# Patient Record
Sex: MALE | Race: WHITE | Employment: UNEMPLOYED | URBAN - NONMETROPOLITAN AREA
[De-identification: names, ages, dates, MRNs, and addresses within clinical notes are randomized per-mention and may not be internally consistent; named-entity substitution may affect disease eponyms.]

---

## 2024-09-21 ENCOUNTER — APPOINTMENT (OUTPATIENT)
Dept: CT IMAGING | Age: 54
End: 2024-09-21
Payer: MEDICAID

## 2024-09-21 ENCOUNTER — HOSPITAL ENCOUNTER (EMERGENCY)
Age: 54
Discharge: HOME | End: 2024-09-21
Attending: EMERGENCY MEDICINE
Payer: MEDICAID

## 2024-09-21 VITALS
HEART RATE: 94 BPM | HEIGHT: 70 IN | RESPIRATION RATE: 15 BRPM | SYSTOLIC BLOOD PRESSURE: 147 MMHG | OXYGEN SATURATION: 94 % | WEIGHT: 150 LBS | TEMPERATURE: 98.2 F | BODY MASS INDEX: 21.47 KG/M2 | DIASTOLIC BLOOD PRESSURE: 82 MMHG

## 2024-09-21 DIAGNOSIS — R18.8 OTHER ASCITES: Primary | ICD-10-CM

## 2024-09-21 DIAGNOSIS — C22.8 PRIMARY MALIGNANT NEOPLASM OF LIVER (HCC): ICD-10-CM

## 2024-09-21 LAB
ALBUMIN SERPL-MCNC: 2.8 G/DL (ref 3.5–5.2)
ALP SERPL-CCNC: 158 U/L (ref 40–129)
ALT SERPL-CCNC: 37 U/L (ref 5–41)
ANION GAP SERPL CALCULATED.3IONS-SCNC: 11 MMOL/L (ref 7–19)
APTT PPP: 31.4 SEC (ref 26–36.2)
AST SERPL-CCNC: 52 U/L (ref 5–40)
BASOPHILS # BLD: 0 K/UL (ref 0–0.2)
BASOPHILS NFR BLD: 0.7 % (ref 0–1)
BILIRUB SERPL-MCNC: 2.2 MG/DL (ref 0.2–1.2)
BUN SERPL-MCNC: 5 MG/DL (ref 6–20)
CALCIUM SERPL-MCNC: 8 MG/DL (ref 8.6–10)
CHLORIDE SERPL-SCNC: 107 MMOL/L (ref 98–111)
CO2 SERPL-SCNC: 22 MMOL/L (ref 22–29)
CREAT SERPL-MCNC: 0.5 MG/DL (ref 0.7–1.2)
EOSINOPHIL # BLD: 0.1 K/UL (ref 0–0.6)
EOSINOPHIL NFR BLD: 2.4 % (ref 0–5)
ERYTHROCYTE [DISTWIDTH] IN BLOOD BY AUTOMATED COUNT: 17.7 % (ref 11.5–14.5)
ETHANOLAMINE SERPL-MCNC: <10 MG/DL (ref 0–0.08)
GLUCOSE SERPL-MCNC: 101 MG/DL (ref 70–99)
HCT VFR BLD AUTO: 33.6 % (ref 42–52)
HGB BLD-MCNC: 11.3 G/DL (ref 14–18)
IMM GRANULOCYTES # BLD: 0 K/UL
INR PPP: 1.57 (ref 0.88–1.18)
LIPASE SERPL-CCNC: 29 U/L (ref 13–60)
LYMPHOCYTES # BLD: 1.2 K/UL (ref 1.1–4.5)
LYMPHOCYTES NFR BLD: 27.5 % (ref 20–40)
MCH RBC QN AUTO: 35.9 PG (ref 27–31)
MCHC RBC AUTO-ENTMCNC: 33.6 G/DL (ref 33–37)
MCV RBC AUTO: 106.7 FL (ref 80–94)
MONOCYTES # BLD: 0.6 K/UL (ref 0–0.9)
MONOCYTES NFR BLD: 14.6 % (ref 0–10)
NEUTROPHILS # BLD: 2.3 K/UL (ref 1.5–7.5)
NEUTS SEG NFR BLD: 54.6 % (ref 50–65)
PLATELET # BLD AUTO: 66 K/UL (ref 130–400)
PMV BLD AUTO: 10.7 FL (ref 9.4–12.4)
POTASSIUM SERPL-SCNC: 3.2 MMOL/L (ref 3.5–5)
PROT SERPL-MCNC: 5.9 G/DL (ref 6.4–8.3)
PROTHROMBIN TIME: 18.4 SEC (ref 12–14.6)
RBC # BLD AUTO: 3.15 M/UL (ref 4.7–6.1)
SODIUM SERPL-SCNC: 140 MMOL/L (ref 136–145)
WBC # BLD AUTO: 4.2 K/UL (ref 4.8–10.8)

## 2024-09-21 PROCEDURE — 85025 COMPLETE CBC W/AUTO DIFF WBC: CPT

## 2024-09-21 PROCEDURE — 85730 THROMBOPLASTIN TIME PARTIAL: CPT

## 2024-09-21 PROCEDURE — 99285 EMERGENCY DEPT VISIT HI MDM: CPT

## 2024-09-21 PROCEDURE — 80053 COMPREHEN METABOLIC PANEL: CPT

## 2024-09-21 PROCEDURE — 85610 PROTHROMBIN TIME: CPT

## 2024-09-21 PROCEDURE — 96375 TX/PRO/DX INJ NEW DRUG ADDON: CPT

## 2024-09-21 PROCEDURE — 6360000004 HC RX CONTRAST MEDICATION: Performed by: EMERGENCY MEDICINE

## 2024-09-21 PROCEDURE — 83690 ASSAY OF LIPASE: CPT

## 2024-09-21 PROCEDURE — 6360000002 HC RX W HCPCS: Performed by: EMERGENCY MEDICINE

## 2024-09-21 PROCEDURE — 36415 COLL VENOUS BLD VENIPUNCTURE: CPT

## 2024-09-21 PROCEDURE — 96374 THER/PROPH/DIAG INJ IV PUSH: CPT

## 2024-09-21 PROCEDURE — 82077 ASSAY SPEC XCP UR&BREATH IA: CPT

## 2024-09-21 PROCEDURE — 74177 CT ABD & PELVIS W/CONTRAST: CPT

## 2024-09-21 RX ORDER — IOPAMIDOL 755 MG/ML
70 INJECTION, SOLUTION INTRAVASCULAR
Status: COMPLETED | OUTPATIENT
Start: 2024-09-21 | End: 2024-09-21

## 2024-09-21 RX ORDER — LORAZEPAM 1 MG/1
1 TABLET ORAL EVERY 6 HOURS PRN
Qty: 8 TABLET | Refills: 0 | Status: ON HOLD | OUTPATIENT
Start: 2024-09-21 | End: 2024-10-21

## 2024-09-21 RX ORDER — MORPHINE SULFATE 4 MG/ML
4 INJECTION, SOLUTION INTRAMUSCULAR; INTRAVENOUS ONCE
Status: COMPLETED | OUTPATIENT
Start: 2024-09-21 | End: 2024-09-21

## 2024-09-21 RX ORDER — ONDANSETRON 2 MG/ML
4 INJECTION INTRAMUSCULAR; INTRAVENOUS ONCE
Status: COMPLETED | OUTPATIENT
Start: 2024-09-21 | End: 2024-09-21

## 2024-09-21 RX ADMIN — MORPHINE SULFATE 4 MG: 4 INJECTION, SOLUTION INTRAMUSCULAR; INTRAVENOUS at 09:27

## 2024-09-21 RX ADMIN — ONDANSETRON 4 MG: 2 INJECTION INTRAMUSCULAR; INTRAVENOUS at 09:27

## 2024-09-21 RX ADMIN — IOPAMIDOL 70 ML: 755 INJECTION, SOLUTION INTRAVENOUS at 09:16

## 2024-09-21 ASSESSMENT — PAIN SCALES - GENERAL: PAINLEVEL_OUTOF10: 9

## 2024-09-21 NOTE — ED PROVIDER NOTES
[facility] EMERGENCY DEPT  eMERGENCY dEPARTMENT eNCOUnter      Pt Name: Philip Young  MRN: [id number]  Birthdate 1970  Date of evaluation: 9/21/2024  Provider: Wayne Coe MD    CHIEF COMPLAINT       Chief Complaint   Patient presents with    Abdominal Pain     And swelling          HISTORY OF PRESENT ILLNESS   (Location/Symptom, Timing/Onset,Context/Setting, Quality, Duration, Modifying Factors, Severity)  Note limiting factors.   Philip Young is a 54 y.o. male who presents to the emergency department ***     HPI    NursingNotes were reviewed.    REVIEW OF SYSTEMS    (2-9 systems for level 4, 10 or more for level 5)     Review of Systems         PAST MEDICALHISTORY     Past Medical History:   Diagnosis Date    Liver cancer (HCC)          SURGICAL HISTORY     History reviewed. No pertinent surgical history.      CURRENT MEDICATIONS     Previous Medications    No medications on file       ALLERGIES     Patient has no known allergies.    FAMILY HISTORY     History reviewed. No pertinent family history.       SOCIAL HISTORY       Social History     Socioeconomic History    Marital status:      Spouse name: None    Number of children: None    Years of education: None    Highest education level: None   Tobacco Use    Smoking status: Never    Smokeless tobacco: Never   Vaping Use    Vaping status: Never Used   Substance and Sexual Activity    Alcohol use: Not Currently     Comment: been in [facility] for 13 years    Drug use: Never    Sexual activity: Defer     Social Determinants of Health      Received from [facility]    Family and Community Support    Received from [facility]    Abuse Screen    Received from [facility]    Housing Stability       SCREENINGS    Luis [place] Coma Scale  Eye Opening: Spontaneous  Best Verbal Response: Oriented  Best Motor Response: Obeys commands  [place] Coma Scale Score: 15        PHYSICAL EXAM    (up to 7 for level 4, 8 or more for level  5)     ED Triage Vitals [09/21/24 0753]   BP Systolic BP Percentile Diastolic BP Percentile Temp Temp src Pulse Respirations SpO2   (!) 150/83 -- -- 98.2 °F (36.8 °C) -- (!) 104 18 96 %      Height Weight - Scale         1.778 m (5' 10\") 68 kg (150 lb)             Physical Exam    DIAGNOSTIC RESULTS     EKG: All EKG's areinterpreted by the Emergency Department Physician who either signs or Co-signs this chart in the absence of a cardiologist.    ***    RADIOLOGY:  Non-plain film images such as CT, Ultrasound and MRI are read by the radiologist. Plain radiographic images are visualized and preliminarily interpreted bythe emergency physician with the below findings:    ***      CT ABDOMEN PELVIS W IV CONTRAST Additional Contrast? None   Final Result   1.  Cirrhosis with sequela of portal hypertension including moderate volume ascites.   2.  Indeterminate low-density and high density/enhancing hepatic lesions.  Correlate with any prior imaging, consider follow-up MRI as warranted.   ---------------------------        All CT scans are performed using dose optimization techniques as appropriate to the performed exam and include    at least one of the following: Automated exposure control, adjustment of the mA and/or kV according to size, and the use of iterative reconstruction technique.        ______________________________________    Electronically signed by: MCKENNA VILLALBA M.D.   Date:     09/21/2024   Time:    10:19               LABS:  Labs Reviewed   CBC WITH AUTO DIFFERENTIAL - Abnormal; Notable for the following components:       Result Value    WBC 4.2 (*)     RBC 3.15 (*)     Hemoglobin 11.3 (*)     Hematocrit 33.6 (*)     .7 (*)     MCH 35.9 (*)     RDW 17.7 (*)     Platelets 66 (*)     Monocytes % 14.6 (*)     All other components within normal limits   COMPREHENSIVE METABOLIC PANEL - Abnormal; Notable for the following components:    Potassium 3.2 (*)     Glucose 101 (*)     BUN 5 (*)     Creatinine 0.5

## 2024-09-28 ENCOUNTER — APPOINTMENT (OUTPATIENT)
Dept: GENERAL RADIOLOGY | Age: 54
End: 2024-09-28
Payer: MEDICAID

## 2024-09-28 ENCOUNTER — HOSPITAL ENCOUNTER (INPATIENT)
Age: 54
LOS: 4 days | Discharge: HOSPICE | DRG: 951 | End: 2024-10-02
Attending: FAMILY MEDICINE | Admitting: FAMILY MEDICINE
Payer: MEDICAID

## 2024-09-28 ENCOUNTER — HOSPITAL ENCOUNTER (EMERGENCY)
Age: 54
Discharge: HOSPICE | End: 2024-09-28
Attending: EMERGENCY MEDICINE
Payer: MEDICAID

## 2024-09-28 VITALS
SYSTOLIC BLOOD PRESSURE: 136 MMHG | HEART RATE: 98 BPM | DIASTOLIC BLOOD PRESSURE: 64 MMHG | WEIGHT: 150 LBS | RESPIRATION RATE: 20 BRPM | OXYGEN SATURATION: 92 % | TEMPERATURE: 97.6 F | BODY MASS INDEX: 21.52 KG/M2

## 2024-09-28 DIAGNOSIS — K74.60 HEPATIC CIRRHOSIS DUE TO CHRONIC HEPATITIS C INFECTION (HCC): Primary | ICD-10-CM

## 2024-09-28 DIAGNOSIS — B18.2 HEPATIC CIRRHOSIS DUE TO CHRONIC HEPATITIS C INFECTION (HCC): Primary | ICD-10-CM

## 2024-09-28 DIAGNOSIS — Z51.5 ADMISSION FOR HOSPICE CARE: ICD-10-CM

## 2024-09-28 PROBLEM — C22.0 CARCINOMA OF LIVER (HCC): Status: ACTIVE | Noted: 2024-09-28

## 2024-09-28 LAB
ALBUMIN SERPL-MCNC: 2.7 G/DL (ref 3.5–5.2)
ALP SERPL-CCNC: 173 U/L (ref 40–129)
ALT SERPL-CCNC: 36 U/L (ref 5–41)
ANION GAP SERPL CALCULATED.3IONS-SCNC: 12 MMOL/L (ref 7–19)
AST SERPL-CCNC: 60 U/L (ref 5–40)
BASOPHILS # BLD: 0 K/UL (ref 0–0.2)
BASOPHILS NFR BLD: 0.6 % (ref 0–1)
BILIRUB SERPL-MCNC: 2.7 MG/DL (ref 0.2–1.2)
BUN SERPL-MCNC: 4 MG/DL (ref 6–20)
CALCIUM SERPL-MCNC: 8.2 MG/DL (ref 8.6–10)
CHLORIDE SERPL-SCNC: 105 MMOL/L (ref 98–111)
CO2 SERPL-SCNC: 21 MMOL/L (ref 22–29)
CREAT SERPL-MCNC: 0.5 MG/DL (ref 0.7–1.2)
EKG P AXIS: 61 DEGREES
EKG P-R INTERVAL: 152 MS
EKG Q-T INTERVAL: 382 MS
EKG QRS DURATION: 94 MS
EKG QTC CALCULATION (BAZETT): 455 MS
EKG T AXIS: 9 DEGREES
EOSINOPHIL # BLD: 0.1 K/UL (ref 0–0.6)
EOSINOPHIL NFR BLD: 2.3 % (ref 0–5)
ERYTHROCYTE [DISTWIDTH] IN BLOOD BY AUTOMATED COUNT: 17.7 % (ref 11.5–14.5)
GLUCOSE SERPL-MCNC: 149 MG/DL (ref 70–99)
HCT VFR BLD AUTO: 37.3 % (ref 42–52)
HGB BLD-MCNC: 12.3 G/DL (ref 14–18)
IMM GRANULOCYTES # BLD: 0 K/UL
LYMPHOCYTES # BLD: 1.4 K/UL (ref 1.1–4.5)
LYMPHOCYTES NFR BLD: 28.7 % (ref 20–40)
MCH RBC QN AUTO: 36 PG (ref 27–31)
MCHC RBC AUTO-ENTMCNC: 33 G/DL (ref 33–37)
MCV RBC AUTO: 109.1 FL (ref 80–94)
MONOCYTES # BLD: 0.5 K/UL (ref 0–0.9)
MONOCYTES NFR BLD: 10.5 % (ref 0–10)
NEUTROPHILS # BLD: 2.8 K/UL (ref 1.5–7.5)
NEUTS SEG NFR BLD: 57.7 % (ref 50–65)
PLATELET # BLD AUTO: 93 K/UL (ref 130–400)
PMV BLD AUTO: 10.9 FL (ref 9.4–12.4)
POTASSIUM SERPL-SCNC: 3.5 MMOL/L (ref 3.5–5)
PROT SERPL-MCNC: 6.3 G/DL (ref 6.4–8.3)
RBC # BLD AUTO: 3.42 M/UL (ref 4.7–6.1)
SODIUM SERPL-SCNC: 138 MMOL/L (ref 136–145)
WBC # BLD AUTO: 4.8 K/UL (ref 4.8–10.8)

## 2024-09-28 PROCEDURE — 93010 ELECTROCARDIOGRAM REPORT: CPT | Performed by: INTERNAL MEDICINE

## 2024-09-28 PROCEDURE — 85025 COMPLETE CBC W/AUTO DIFF WBC: CPT

## 2024-09-28 PROCEDURE — 96374 THER/PROPH/DIAG INJ IV PUSH: CPT

## 2024-09-28 PROCEDURE — 71045 X-RAY EXAM CHEST 1 VIEW: CPT

## 2024-09-28 PROCEDURE — 36415 COLL VENOUS BLD VENIPUNCTURE: CPT

## 2024-09-28 PROCEDURE — 93005 ELECTROCARDIOGRAM TRACING: CPT | Performed by: EMERGENCY MEDICINE

## 2024-09-28 PROCEDURE — 6360000002 HC RX W HCPCS: Performed by: EMERGENCY MEDICINE

## 2024-09-28 PROCEDURE — 6560000002 HC HOSPICE GENERAL INPATIENT

## 2024-09-28 PROCEDURE — 80053 COMPREHEN METABOLIC PANEL: CPT

## 2024-09-28 PROCEDURE — 99285 EMERGENCY DEPT VISIT HI MDM: CPT

## 2024-09-28 PROCEDURE — 6370000000 HC RX 637 (ALT 250 FOR IP): Performed by: FAMILY MEDICINE

## 2024-09-28 RX ORDER — FENTANYL 12.5 UG/1
1 PATCH TRANSDERMAL
Status: DISCONTINUED | OUTPATIENT
Start: 2024-09-28 | End: 2024-09-30

## 2024-09-28 RX ORDER — DOCUSATE SODIUM 100 MG/1
100 CAPSULE, LIQUID FILLED ORAL DAILY
Status: DISCONTINUED | OUTPATIENT
Start: 2024-09-29 | End: 2024-10-02 | Stop reason: HOSPADM

## 2024-09-28 RX ORDER — BISACODYL 10 MG
10 SUPPOSITORY, RECTAL RECTAL DAILY PRN
Status: DISCONTINUED | OUTPATIENT
Start: 2024-09-28 | End: 2024-10-02 | Stop reason: HOSPADM

## 2024-09-28 RX ORDER — HYDROMORPHONE HYDROCHLORIDE 1 MG/ML
1 INJECTION, SOLUTION INTRAMUSCULAR; INTRAVENOUS; SUBCUTANEOUS
Status: DISCONTINUED | OUTPATIENT
Start: 2024-09-28 | End: 2024-10-02

## 2024-09-28 RX ORDER — HALOPERIDOL 2 MG/ML
5 SOLUTION ORAL EVERY 6 HOURS PRN
Status: DISCONTINUED | OUTPATIENT
Start: 2024-09-28 | End: 2024-10-01

## 2024-09-28 RX ORDER — FUROSEMIDE 20 MG
20 TABLET ORAL 2 TIMES DAILY
Status: DISCONTINUED | OUTPATIENT
Start: 2024-09-28 | End: 2024-10-02 | Stop reason: HOSPADM

## 2024-09-28 RX ORDER — MORPHINE SULFATE 2 MG/ML
2 INJECTION, SOLUTION INTRAMUSCULAR; INTRAVENOUS ONCE
Status: COMPLETED | OUTPATIENT
Start: 2024-09-28 | End: 2024-09-28

## 2024-09-28 RX ORDER — SODIUM CHLORIDE 0.9 % (FLUSH) 0.9 %
5-40 SYRINGE (ML) INJECTION PRN
Status: DISCONTINUED | OUTPATIENT
Start: 2024-09-28 | End: 2024-10-02

## 2024-09-28 RX ORDER — ONDANSETRON 4 MG/1
4 TABLET, FILM COATED ORAL EVERY 6 HOURS PRN
Status: DISCONTINUED | OUTPATIENT
Start: 2024-09-28 | End: 2024-10-02 | Stop reason: HOSPADM

## 2024-09-28 RX ORDER — SODIUM CHLORIDE 0.9 % (FLUSH) 0.9 %
5-40 SYRINGE (ML) INJECTION EVERY 12 HOURS SCHEDULED
Status: DISCONTINUED | OUTPATIENT
Start: 2024-09-28 | End: 2024-10-02 | Stop reason: ALTCHOICE

## 2024-09-28 RX ORDER — LORAZEPAM 2 MG/ML
1 INJECTION INTRAMUSCULAR EVERY 6 HOURS PRN
Status: DISCONTINUED | OUTPATIENT
Start: 2024-09-28 | End: 2024-10-02

## 2024-09-28 RX ORDER — LORAZEPAM 0.5 MG/1
1 TABLET ORAL EVERY 6 HOURS PRN
Status: DISCONTINUED | OUTPATIENT
Start: 2024-09-28 | End: 2024-10-01

## 2024-09-28 RX ADMIN — MORPHINE SULFATE 2 MG: 2 INJECTION, SOLUTION INTRAMUSCULAR; INTRAVENOUS at 17:14

## 2024-09-28 RX ADMIN — FUROSEMIDE 20 MG: 20 TABLET ORAL at 21:31

## 2024-09-28 RX ADMIN — LORAZEPAM 1 MG: 0.5 TABLET ORAL at 21:31

## 2024-09-28 ASSESSMENT — ENCOUNTER SYMPTOMS
NAUSEA: 0
SORE THROAT: 0
SHORTNESS OF BREATH: 1
ABDOMINAL PAIN: 1
RHINORRHEA: 0
SINUS PRESSURE: 0
DIARRHEA: 0
VOMITING: 0

## 2024-09-28 ASSESSMENT — PAIN DESCRIPTION - LOCATION: LOCATION: ABDOMEN

## 2024-09-28 ASSESSMENT — PAIN DESCRIPTION - DESCRIPTORS: DESCRIPTORS: CRAMPING;ACHING;DISCOMFORT

## 2024-09-28 ASSESSMENT — PAIN DESCRIPTION - PAIN TYPE: TYPE: CHRONIC PAIN

## 2024-09-28 ASSESSMENT — PAIN SCALES - GENERAL
PAINLEVEL_OUTOF10: 9
PAINLEVEL_OUTOF10: 9

## 2024-09-28 NOTE — ED PROVIDER NOTES
[facility] EMERGENCY DEPT  eMERGENCY dEPARTMENT eNCOUnter      Pt Name: Philip Young  MRN: [id number]  Birthdate 1970  Date of evaluation: 9/28/2024  Provider: Octaviano Lopez MD    CHIEF COMPLAINT       Chief Complaint   Patient presents with    Abdominal Pain     Abd swelling from liver cancer    Shortness of Breath         HISTORY OF PRESENT ILLNESS   (Location/Symptom, Timing/Onset,Context/Setting, Quality, Duration, Modifying Factors, Severity)  Note limiting factors.   Philip Young is a 54 y.o. male who presents to the emergency department ascites and shortness of breath.     HPI patient is a 54-year-old white male with a history of hep C cirrhosis who is here last week for the same problem he is here for this week which is that he desires therapeutic paracentesis and to be enrolled in hospice.  Last week when he was told he would have to wait for paracentesis he pulled out his IV and left the hospital and in the interim has not sought out care as per him and his family and today returns seeking the same things.  He was discharged on compassionate release 2 months ago from the [facility] senior care system.  Reports that over several years he had multiple procedures done to manage his cirrhosis including surgery and paracentesis and medications.  He has no access to any of that now over the last 2 months since his discharge from [facility] and moving back to this area where he is from.  No fever.  No nausea or vomiting.  His goal today is to be enrolled in hospice.  He reports that he has been taking Tylenol by the handful because he has no access to his typical pain medication.  He says before when he was in the senior care system he received oxycodone and lorazepam.  Denies being an active drinker or drug abuser.      NursingNotes were reviewed.    REVIEW OF SYSTEMS    (2-9 systems for level 4, 10 or more for level 5)     Review of Systems   Constitutional:  Negative for chills, diaphoresis, fatigue and fever.   HENT:

## 2024-09-28 NOTE — ED NOTES
Hospice consult called [phone]. Spoke with Rin who stated she was driving and would have another nurse call me shortly.

## 2024-09-29 PROCEDURE — 6560000002 HC HOSPICE GENERAL INPATIENT

## 2024-09-29 PROCEDURE — 2580000003 HC RX 258: Performed by: FAMILY MEDICINE

## 2024-09-29 PROCEDURE — 6370000000 HC RX 637 (ALT 250 FOR IP): Performed by: FAMILY MEDICINE

## 2024-09-29 RX ORDER — HYDROMORPHONE HYDROCHLORIDE 2 MG/1
1 TABLET ORAL EVERY 4 HOURS PRN
Status: DISCONTINUED | OUTPATIENT
Start: 2024-09-29 | End: 2024-10-01

## 2024-09-29 RX ORDER — LACTULOSE 10 G/15ML
20 SOLUTION ORAL 3 TIMES DAILY
Status: DISCONTINUED | OUTPATIENT
Start: 2024-09-29 | End: 2024-10-02 | Stop reason: HOSPADM

## 2024-09-29 RX ADMIN — LACTULOSE 20 G: 20 SOLUTION ORAL at 08:53

## 2024-09-29 RX ADMIN — SODIUM CHLORIDE, PRESERVATIVE FREE 10 ML: 5 INJECTION INTRAVENOUS at 20:09

## 2024-09-29 RX ADMIN — LORAZEPAM 1 MG: 0.5 TABLET ORAL at 09:01

## 2024-09-29 RX ADMIN — FUROSEMIDE 20 MG: 20 TABLET ORAL at 08:53

## 2024-09-29 RX ADMIN — HYDROMORPHONE HYDROCHLORIDE 1 MG: 2 TABLET ORAL at 09:01

## 2024-09-29 RX ADMIN — HYDROMORPHONE HYDROCHLORIDE 1 MG: 2 TABLET ORAL at 13:39

## 2024-09-29 RX ADMIN — DOCUSATE SODIUM 100 MG: 100 CAPSULE, LIQUID FILLED ORAL at 08:53

## 2024-09-29 RX ADMIN — LACTULOSE 20 G: 20 SOLUTION ORAL at 20:09

## 2024-09-29 RX ADMIN — HYDROMORPHONE HYDROCHLORIDE 1 MG: 2 TABLET ORAL at 03:14

## 2024-09-29 RX ADMIN — HYDROMORPHONE HYDROCHLORIDE 1 MG: 2 TABLET ORAL at 17:28

## 2024-09-29 RX ADMIN — HYDROMORPHONE HYDROCHLORIDE 1 MG: 2 TABLET ORAL at 22:19

## 2024-09-29 RX ADMIN — LACTULOSE 20 G: 20 SOLUTION ORAL at 13:39

## 2024-09-29 RX ADMIN — SODIUM CHLORIDE, PRESERVATIVE FREE 10 ML: 5 INJECTION INTRAVENOUS at 08:55

## 2024-09-29 RX ADMIN — FUROSEMIDE 20 MG: 20 TABLET ORAL at 17:29

## 2024-09-29 NOTE — H&P
Fentanyl patch 12 mcg/h placed for pain control will titrate as needed  Dilaudid 1 mg every 4 hours as needed severe pain, will try p.o. but if patient becomes nauseous may need to do IV  Lasix 20 mg 1 tablet twice a day  Lactulose 20 g 3 times daily  Ativan 1 mg p.o. every 6 hours as needed  Zofran 4 mg every 6 hours as needed nausea and vomiting  Haldol 5 mg every 6 hours as needed severe agitation  Colace 100 milligrams daily  Ultrasound-guided paracentesis hopefully tomorrow with albumin if needed  CMP, CBC, ammonia level in a.m.  Social work will definitely need to be involved with discharge planning and follow-up care      He is GIP appropriate for pain control and therapeutic paracentesis.    Consultations Ordered:  None    Electronically signed by Lisa Garcia MD on 9/29/24 at 7:45 AM CDT

## 2024-09-30 ENCOUNTER — APPOINTMENT (OUTPATIENT)
Dept: ULTRASOUND IMAGING | Age: 54
DRG: 951 | End: 2024-09-30
Attending: FAMILY MEDICINE
Payer: MEDICAID

## 2024-09-30 LAB
ALBUMIN SERPL-MCNC: 2.5 G/DL (ref 3.5–5.2)
ALP SERPL-CCNC: 139 U/L (ref 40–129)
ALT SERPL-CCNC: 30 U/L (ref 5–41)
AMMONIA PLAS-SCNC: 65 UMOL/L (ref 16–60)
ANION GAP SERPL CALCULATED.3IONS-SCNC: 9 MMOL/L (ref 7–19)
AST SERPL-CCNC: 40 U/L (ref 5–40)
BASOPHILS # BLD: 0 K/UL (ref 0–0.2)
BASOPHILS NFR BLD: 0.5 % (ref 0–1)
BILIRUB SERPL-MCNC: 2.9 MG/DL (ref 0.2–1.2)
BUN SERPL-MCNC: 4 MG/DL (ref 6–20)
CALCIUM SERPL-MCNC: 7.4 MG/DL (ref 8.6–10)
CHLORIDE SERPL-SCNC: 103 MMOL/L (ref 98–111)
CO2 SERPL-SCNC: 25 MMOL/L (ref 22–29)
CREAT SERPL-MCNC: 0.4 MG/DL (ref 0.7–1.2)
EOSINOPHIL # BLD: 0.1 K/UL (ref 0–0.6)
EOSINOPHIL NFR BLD: 1.3 % (ref 0–5)
ERYTHROCYTE [DISTWIDTH] IN BLOOD BY AUTOMATED COUNT: 17 % (ref 11.5–14.5)
GLUCOSE SERPL-MCNC: 105 MG/DL (ref 70–99)
HCT VFR BLD AUTO: 35.7 % (ref 42–52)
HGB BLD-MCNC: 11.9 G/DL (ref 14–18)
IMM GRANULOCYTES # BLD: 0 K/UL
LYMPHOCYTES # BLD: 1.1 K/UL (ref 1.1–4.5)
LYMPHOCYTES NFR BLD: 17.7 % (ref 20–40)
MAGNESIUM SERPL-MCNC: 1.7 MG/DL (ref 1.6–2.6)
MCH RBC QN AUTO: 36.6 PG (ref 27–31)
MCHC RBC AUTO-ENTMCNC: 33.3 G/DL (ref 33–37)
MCV RBC AUTO: 109.8 FL (ref 80–94)
MONOCYTES # BLD: 0.7 K/UL (ref 0–0.9)
MONOCYTES NFR BLD: 11.3 % (ref 0–10)
NEUTROPHILS # BLD: 4.3 K/UL (ref 1.5–7.5)
NEUTS SEG NFR BLD: 68.9 % (ref 50–65)
PLATELET # BLD AUTO: 90 K/UL (ref 130–400)
PMV BLD AUTO: 10.5 FL (ref 9.4–12.4)
POTASSIUM SERPL-SCNC: 3.4 MMOL/L (ref 3.5–5)
PROT SERPL-MCNC: 5.8 G/DL (ref 6.4–8.3)
RBC # BLD AUTO: 3.25 M/UL (ref 4.7–6.1)
SODIUM SERPL-SCNC: 137 MMOL/L (ref 136–145)
WBC # BLD AUTO: 6.3 K/UL (ref 4.8–10.8)

## 2024-09-30 PROCEDURE — 0W9G3ZZ DRAINAGE OF PERITONEAL CAVITY, PERCUTANEOUS APPROACH: ICD-10-PCS | Performed by: RADIOLOGY

## 2024-09-30 PROCEDURE — 2580000003 HC RX 258: Performed by: FAMILY MEDICINE

## 2024-09-30 PROCEDURE — 82140 ASSAY OF AMMONIA: CPT

## 2024-09-30 PROCEDURE — 6370000000 HC RX 637 (ALT 250 FOR IP): Performed by: INTERNAL MEDICINE

## 2024-09-30 PROCEDURE — 6370000000 HC RX 637 (ALT 250 FOR IP): Performed by: FAMILY MEDICINE

## 2024-09-30 PROCEDURE — 36415 COLL VENOUS BLD VENIPUNCTURE: CPT

## 2024-09-30 PROCEDURE — 85025 COMPLETE CBC W/AUTO DIFF WBC: CPT

## 2024-09-30 PROCEDURE — 6560000002 HC HOSPICE GENERAL INPATIENT

## 2024-09-30 PROCEDURE — 83735 ASSAY OF MAGNESIUM: CPT

## 2024-09-30 PROCEDURE — 80053 COMPREHEN METABOLIC PANEL: CPT

## 2024-09-30 PROCEDURE — C1729 CATH, DRAINAGE: HCPCS

## 2024-09-30 RX ORDER — FENTANYL 25 UG/1
1 PATCH TRANSDERMAL
Status: DISCONTINUED | OUTPATIENT
Start: 2024-09-30 | End: 2024-10-02 | Stop reason: HOSPADM

## 2024-09-30 RX ADMIN — LORAZEPAM 1 MG: 0.5 TABLET ORAL at 02:06

## 2024-09-30 RX ADMIN — FUROSEMIDE 20 MG: 20 TABLET ORAL at 08:26

## 2024-09-30 RX ADMIN — SODIUM CHLORIDE, PRESERVATIVE FREE 10 ML: 5 INJECTION INTRAVENOUS at 08:27

## 2024-09-30 RX ADMIN — LACTULOSE 20 G: 20 SOLUTION ORAL at 20:50

## 2024-09-30 RX ADMIN — DOCUSATE SODIUM 100 MG: 100 CAPSULE, LIQUID FILLED ORAL at 08:26

## 2024-09-30 RX ADMIN — ONDANSETRON HYDROCHLORIDE 4 MG: 4 TABLET, FILM COATED ORAL at 02:01

## 2024-09-30 RX ADMIN — LACTULOSE 20 G: 20 SOLUTION ORAL at 08:26

## 2024-09-30 RX ADMIN — LORAZEPAM 1 MG: 0.5 TABLET ORAL at 21:07

## 2024-09-30 RX ADMIN — HYDROMORPHONE HYDROCHLORIDE 1 MG: 2 TABLET ORAL at 21:06

## 2024-09-30 RX ADMIN — HYDROMORPHONE HYDROCHLORIDE 1 MG: 2 TABLET ORAL at 03:14

## 2024-09-30 RX ADMIN — HYDROMORPHONE HYDROCHLORIDE 1 MG: 2 TABLET ORAL at 12:50

## 2024-09-30 RX ADMIN — SODIUM CHLORIDE, PRESERVATIVE FREE 10 ML: 5 INJECTION INTRAVENOUS at 20:50

## 2024-09-30 RX ADMIN — FUROSEMIDE 20 MG: 20 TABLET ORAL at 18:24

## 2024-09-30 RX ADMIN — LORAZEPAM 1 MG: 0.5 TABLET ORAL at 12:50

## 2024-09-30 ASSESSMENT — ENCOUNTER SYMPTOMS
VOMITING: 0
ABDOMINAL PAIN: 1
DIARRHEA: 0
SHORTNESS OF BREATH: 0
NAUSEA: 0
ABDOMINAL DISTENTION: 1
ABDOMINAL PAIN: 0
ABDOMINAL DISTENTION: 1

## 2024-09-30 ASSESSMENT — PAIN DESCRIPTION - LOCATION: LOCATION: SCROTUM;ABDOMEN

## 2024-09-30 ASSESSMENT — PAIN SCALES - GENERAL: PAINLEVEL_OUTOF10: 8

## 2024-10-01 PROCEDURE — 6560000002 HC HOSPICE GENERAL INPATIENT

## 2024-10-01 PROCEDURE — 6370000000 HC RX 637 (ALT 250 FOR IP): Performed by: FAMILY MEDICINE

## 2024-10-01 PROCEDURE — 2580000003 HC RX 258: Performed by: FAMILY MEDICINE

## 2024-10-01 PROCEDURE — 6370000000 HC RX 637 (ALT 250 FOR IP): Performed by: INTERNAL MEDICINE

## 2024-10-01 RX ORDER — SPIRONOLACTONE 25 MG/1
25 TABLET ORAL DAILY
Status: DISCONTINUED | OUTPATIENT
Start: 2024-10-01 | End: 2024-10-02 | Stop reason: HOSPADM

## 2024-10-01 RX ORDER — HYDROMORPHONE HYDROCHLORIDE 2 MG/1
2 TABLET ORAL
Status: DISCONTINUED | OUTPATIENT
Start: 2024-10-01 | End: 2024-10-02 | Stop reason: HOSPADM

## 2024-10-01 RX ORDER — DOXYCYCLINE 100 MG/1
100 CAPSULE ORAL EVERY 12 HOURS SCHEDULED
Status: DISCONTINUED | OUTPATIENT
Start: 2024-10-01 | End: 2024-10-02 | Stop reason: HOSPADM

## 2024-10-01 RX ORDER — LORAZEPAM 0.5 MG/1
1 TABLET ORAL
Status: DISCONTINUED | OUTPATIENT
Start: 2024-10-01 | End: 2024-10-02 | Stop reason: HOSPADM

## 2024-10-01 RX ADMIN — DOCUSATE SODIUM 100 MG: 100 CAPSULE, LIQUID FILLED ORAL at 08:38

## 2024-10-01 RX ADMIN — DOXYCYCLINE HYCLATE 100 MG: 100 CAPSULE ORAL at 21:23

## 2024-10-01 RX ADMIN — DOXYCYCLINE HYCLATE 100 MG: 100 CAPSULE ORAL at 08:39

## 2024-10-01 RX ADMIN — SPIRONOLACTONE 25 MG: 25 TABLET ORAL at 08:38

## 2024-10-01 RX ADMIN — HYDROMORPHONE HYDROCHLORIDE 2 MG: 2 TABLET ORAL at 12:01

## 2024-10-01 RX ADMIN — HYDROMORPHONE HYDROCHLORIDE 2 MG: 2 TABLET ORAL at 21:23

## 2024-10-01 RX ADMIN — FUROSEMIDE 20 MG: 20 TABLET ORAL at 08:38

## 2024-10-01 RX ADMIN — SODIUM CHLORIDE, PRESERVATIVE FREE 10 ML: 5 INJECTION INTRAVENOUS at 21:23

## 2024-10-01 RX ADMIN — HYDROMORPHONE HYDROCHLORIDE 2 MG: 2 TABLET ORAL at 08:39

## 2024-10-01 RX ADMIN — LACTULOSE 20 G: 20 SOLUTION ORAL at 08:38

## 2024-10-01 RX ADMIN — FUROSEMIDE 20 MG: 20 TABLET ORAL at 17:16

## 2024-10-01 RX ADMIN — HYDROMORPHONE HYDROCHLORIDE 1 MG: 2 TABLET ORAL at 00:35

## 2024-10-01 RX ADMIN — SODIUM CHLORIDE, PRESERVATIVE FREE 10 ML: 5 INJECTION INTRAVENOUS at 08:40

## 2024-10-01 RX ADMIN — Medication 3 MG: at 00:35

## 2024-10-01 RX ADMIN — LACTULOSE 20 G: 20 SOLUTION ORAL at 21:23

## 2024-10-01 RX ADMIN — HYDROMORPHONE HYDROCHLORIDE 2 MG: 2 TABLET ORAL at 17:16

## 2024-10-01 RX ADMIN — LACTULOSE 20 G: 20 SOLUTION ORAL at 17:15

## 2024-10-01 ASSESSMENT — PAIN SCALES - GENERAL
PAINLEVEL_OUTOF10: 0
PAINLEVEL_OUTOF10: 9
PAINLEVEL_OUTOF10: 0
PAINLEVEL_OUTOF10: 9
PAINLEVEL_OUTOF10: 0
PAINLEVEL_OUTOF10: 9
PAINLEVEL_OUTOF10: 0

## 2024-10-01 ASSESSMENT — PAIN DESCRIPTION - LOCATION: LOCATION: SCROTUM

## 2024-10-02 VITALS
RESPIRATION RATE: 18 BRPM | TEMPERATURE: 99.9 F | OXYGEN SATURATION: 88 % | SYSTOLIC BLOOD PRESSURE: 130 MMHG | DIASTOLIC BLOOD PRESSURE: 64 MMHG | HEART RATE: 82 BPM

## 2024-10-02 PROCEDURE — 6370000000 HC RX 637 (ALT 250 FOR IP): Performed by: INTERNAL MEDICINE

## 2024-10-02 PROCEDURE — 6370000000 HC RX 637 (ALT 250 FOR IP): Performed by: FAMILY MEDICINE

## 2024-10-02 RX ADMIN — SPIRONOLACTONE 25 MG: 25 TABLET ORAL at 08:19

## 2024-10-02 RX ADMIN — HYDROMORPHONE HYDROCHLORIDE 2 MG: 2 TABLET ORAL at 06:36

## 2024-10-02 RX ADMIN — DOCUSATE SODIUM 100 MG: 100 CAPSULE, LIQUID FILLED ORAL at 08:19

## 2024-10-02 RX ADMIN — FUROSEMIDE 20 MG: 20 TABLET ORAL at 08:19

## 2024-10-02 RX ADMIN — LACTULOSE 20 G: 20 SOLUTION ORAL at 08:19

## 2024-10-02 RX ADMIN — DOXYCYCLINE HYCLATE 100 MG: 100 CAPSULE ORAL at 08:19

## 2024-10-02 RX ADMIN — HYDROMORPHONE HYDROCHLORIDE 2 MG: 2 TABLET ORAL at 09:42

## 2024-10-02 ASSESSMENT — PAIN SCALES - GENERAL: PAINLEVEL_OUTOF10: 9

## 2024-10-02 ASSESSMENT — PAIN DESCRIPTION - LOCATION
LOCATION: ABDOMEN;SCROTUM
LOCATION: ABDOMEN

## 2024-10-02 NOTE — DISCHARGE SUMMARY
Discharge Summary    Date: 10/2/2024  Patient Name: Philip Young    YOB: 1970     Age: 54 y.o.    Admit Date: 9/28/2024  Discharge Date: 10/2/2024  Discharge Condition: Poor    Admission Diagnosis  Liver cell carcinoma [C22.0];Carcinoma of liver (HCC) [C22.0]      Discharge Diagnosis  Principal Problem:    Carcinoma of liver (HCC)  Resolved Problems:    * No resolved hospital problems. *      Hospital Stay  Narrative of Hospital Course:  Patient was admitted to the hospice care center for symptom management of his liver failure and terminal ascites.  He also has a lot of issues with agitation and anxiety.  He has been treated with pain medication and anxiety medication with good results.  He is anxious to go home to his brother's house for continued hospice outpatient care.  Brother is adequate equipment delivered today and he will be discharged to his brother's house and looks well [place] this afternoon and will go on to hospice home care team.    Consultants:  None    Surgeries/procedures Performed:  He had a therapeutic paracentesis performed with 5 L of fluid pulled for comfort and palliative reasons.     Treatments:    Procedures    Paracentesis    Discharge Plan/Disposition:  Home    Hospital/Incidental Findings Requiring Follow Up:    Patient Instructions:    Diet: Regular Diet    Activity:Activity as Tolerated  For number of days (if applicable):      Other Instructions: As well as anxiety medication and has had some scrotal excoriation and cellulitis and he is being treated with doxycycline 100 twice daily and will take 9 more days of that.    Provider Follow-Up:   No follow-ups on file.     Significant Diagnostic Studies:    Recent Labs:  Admission on 09/28/2024  Sodium                                        Date: 09/30/2024  Value: 137         Ref range: 136 - 145 mmol/L   Status: Final  Potassium reflex Magnesium                    Date: 09/30/2024  Value: 3.4 (L)     Ref range: 3.5

## 2024-10-02 NOTE — PROGRESS NOTES
Appetite good for breakfast fed self, waiting to go home.  
Change of shift report and rounds. Patient up in chair talking to family. Alert and oriented. Requested and received warm  blankets.Safety check done.   
Dilaudid 1 mg given po and ativan 1 mg given po and ice pack placed in pillow case and place underneath his scrotum and elevated. Another ice pack placed in pillow case and placed on top of his scrotum. Will continue to monitor. Patient aware to use call light if needing assist to get up. Bed in low position/locked and SR up x2, call light within reach.  
Dilaudid 2 mg given by mouth complains of pain all over. Patient going home today.  
Dr Corral making rounds. Answered patient questions. No new orders at this time.   
Dr. Corral here new orders received and noted.   
Dr. Corral visit with patient this date.  Answered patient's questions and discussed discharge today.  Patient care and condition of past 24 hours discussed.  Patient to be discharged today.  
Dr. Dobbs here making rounds, patient remains GIP level of care.  
F/U: patient resting in bed with eyes closed. Denies any pain at present time. Call light in reach. Bed in low, locked position with side rails up x 2.  
FOLLOW UP FOR PRN DILAUDID 2MG GIVEN.  Patient  resting on couch with eyes closed, respirations  nonlabored.  FLACC 0.  Will continue to monitor.   
Follow up PRN  hydromorphone- patient resting quietly in bed.   
Follow up PRN hydromorphone - patient resting quietly  
Follow up PRN lorazepam and PRN ondansetron- patient is resting quietly in bed with eyes closed.   
Follow up PRN lorazepam- patient resting quietly in bed with eyes closed  
LPN to RN bedside rounds and report completed. Patient resting quietly with eyes closed. No family present.  
PAIN: Patient rates pain to abdomen at 9/10.  He took Dilaudid 2mg oral tablet for pain.  He was assisted to stand at side of bed to urinate 200mls of straw colored clear urine.  He became nauseated when he was standing up and was short of air also.  He refused further medication for nausea and refused oxygen source.  RN cleansed bathroom floor from a period of incontinence during the day.  Patient was able to return back to bed and asked for some ice water.    At FOLLOW-UP at 21:50pm:  Patient was resting on left side with pillow support.  He states he would like to keep the television on for the night.  Ice water was given to patient and he was appreciative.  Patient wanted bedside lamp turned out and door to room pulled for decrease stimulation to promote rest.  He states pain was \"better\", FLACC 2/10.  Right anticubital IV site was leaking during flushing, cathlon was removed from right AC without difficulty.  Cotton and bandaid dressing applied to site.  
PRN DILAUDID 2 MG PO GIVEN. Patient rates pain as 9/10 to groin area.  Will continue to monitor.   
PRN Lorazepam given for anxiety and PRN ondansetron given nausea  
PRN follow up after giving dilaudid 1 mg and haldol 3 mg. Ice Pack to scrotum  Effective, resting quietly with eyes closed.  
PRN follow up after giving dilaudid 1 mg po and ativan 1 mg po  Effective, resting quietly with eyes closed.  
PRN hydromorphone 1 mg PO given for complaints of abd pain   
PRN hydromorphone 1 mg PO given for pain in scrotum and abd.   
PRN lorazepam given for restlessness. Fentanyl patch 12 mg placed between inner left shoulder blade.   
Patient arrived from ED via stretcher. Transferred to bed. Patient is alert and oriented. Asking about pain medications explained that I would get him some as soon as I had orders and he was admitted. Patient verbalized understanding. Spoke with Dr Garcia about admission and obtained orders.   
Patient c/o pain. Visibly agitated. PRN dilaudid and ativan given.  
Patient called out requesting pain medication for abd pain. PRN Hydromorphone 1 mg PO given  
Patient requested pain medication, anxious.  Ativan 1 mg by mouth, Dilaudid 0.5 mg by mouth given. Call light in reach. Bed in low, locked position with side rails up x 2.  Appetite poor at breakfast, feeds self.  
Patient requested pain medication. Dilaudid 1 mg given by mouth. Complains of abdomen pain Patient voided in urinal. Call light in reach. Bed in low, locked position with side rails up x 3.  
Patient resting in bed, no further complains of pain, very drowsy with pain medication. HOB up. Abdomen remains enlarged from ascites. Call light in reach. Patient aware to call for help up to the bathroom. No stool yet for OB.  
Patient resting with eyes closed during rounds with offgoing staff. Respirations even and unlabored. Call light within reach.  
Patient resting with eyes closed, audible snores heard. Appears that PRN meds were effective.  
Patient returned from paracentesis procedure via stretcher. Patient assisted to move from stretcher to bed.   
Patient sitting up in bed eating lunch. Appetite poor, feeds self. Instructed patient that staff had order for OB for stools, voiced understanding.  
Patient transported to ultrasound by transportation tech.  
Patient's brother bedside at this time. Paracentesis procedure discussed with both patient and brother. All questions answered, patient to remain npo until after procedure.  
Pt awake and c/o pain to scrotum. Rates pain at 8/10, states \"It's a little better.\" Medicated with dilaudid 2 mg PO.   
Pt c/o pain to scrotum. Rates pain 9/10. Declines offer of ice pack. Attempted to use athletic support and pt states it is too painful. Medicated with Dilaudid 2 mg by mouth.   
Pt is resting in bed with eyes closed. Respirations even and unlabored. FLACC score=0.  
Pt is resting in bed with eyes closed. Respirations even and unlabored. No acute distress. FLACC score=0.  
Pt requesting something for pain to scrotum rated at 9/10. Medicated with Dilaudid 2 mg PO. Pt tolerating well.  
Pt resting quietly in bed with eyes closed. Respirations even and unlabored. No acute distress.  
Report given to BRYAN Black, questions answered. Explained to patient and brother that nurse would do a tuck in visit, voiced understanding.  
SN heard patient getting out of bed, found patient trying to get out of bed. States I need to use the urinal. SN assist patient to stand and he voided 100 ML dewey urine. Patient moaning in pain and grimacing, and restless, rates pain a 9 in abdomen and scrotum.     0035 AM  Dilaudid 1 mg given po, and only took haldol 3 mg as he said it locks him up and would not take the last 2mg. Ice pack applied underneath scrotum. Will continue to monitor.  
SN present at the patient's bedside along with the night nurses for unit rounds. Patient is sleeping comfortably with no signs or symptoms of pain or restlessness noted. The bed is in the lowest position with the wheels locked. The call light is within reach. No family is present at the bedside. Will continue to monitor.  
Scheduled lactulose given po and IID to right forearm flushed. Patient states my scrotum is hurting so bad and it has been swollen for 5 days. Patient did agree to have SN assess his scrotum and noted area to been red and swollen along with penis is red and swollen. Patient transferred to chair so SN could fix sheets on bed. Patient is weak and needs assist with transfers. SN assist patient back to bed. SR up x 2, call light within reach.  
The pt is lying in bed.  He rates his pain at 4 but states the nurse is aware and getting him some medication.  He expresses no needs.  He states he plans to dc to his brothers home today. He expresses a basis john and seems disinterested. Emotional and sc support provided. Will follow during [facility] stay.   
The pt is sleeping soundly. He did not respond when his name was called. No needs identified. No family present. Will follow.   
Vital signs taken, patient warm to touch T100.6 P108 R16 B/P150/60. Oxygen saturation 88% on room air, and oxygen in place at 2L/NC.With assist X2 patient stood to void and had 200 ML dewey urine in urinal. Assist back to bed, conts ot have pain in scrotum. Will inform Dr. Corral this morning when he makes rounds.  
  .1* 109.8*   RDW 17.7* 17.0*   PLT 93* 90*     CHEMISTRIES:  Recent Labs     09/28/24  1521 09/30/24  0554    137   K 3.5 3.4*    103   CO2 21* 25   BUN 4* 4*   CREATININE 0.5* 0.4*   GLUCOSE 149* 105*   MG  --  1.7     PT/INR:No results for input(s): \"PROTIME\", \"INR\" in the last 72 hours.  APTT:No results for input(s): \"APTT\" in the last 72 hours.  LIVER PROFILE:  Recent Labs     09/28/24  1521 09/30/24  0554   AST 60* 40   ALT 36 30   BILITOT 2.7* 2.9*   ALKPHOS 173* 139*       Imaging Last 24 Hours:  US GUIDED PARACENTESIS    Result Date: 9/30/2024  EXAM:  ULTRASOUND-GUIDED ABDOMINAL PARACENTESIS FOR THERAPEUTIC PURPOSES  HISTORY:  Ascites  COMPARISON:  None.  FINDINGS:  Written and verbal informed consent was obtained.  Patient is of infection and bleeding were discussed.  The left lower abdominal wall was prepped and draped in a sterile manner.  5 ml of 1% lidocaine was injected for local anesthesia  a small skin incision was made with a scalpel.    5-[place] YUEH catheter was advanced into the abdominal peritoneal fluid collection using real-time ultrasound guidance.  5 liters of reddish fluid was removed from the abdomen.  The catheter was removed.  There are no immediate complaints or complications  The patient will be transferred to hospice      abdominal paracentesis was performed and 5 liters was removed from the abdomen. for therapeutic purposes   ______________________________________ Electronically signed by: ЮЛИЯ OLVERA M.D. Date:     09/30/2024 Time:    16:57     Assessment//Plan           Hospital Problems             Last Modified POA    * (Principal) Carcinoma of liver (HCC) 9/28/2024 Yes     Assessment:    Condition: In serious condition.  Worsening.   (Patient had a paracentesis yesterday with 5 L of fluid removed.  He is not complaining of his much abdominal pain today as he is continued scrotal pain and edema.  Scrotum looks more excoriated today.  He has been getting 
3.4*    103   CO2 21* 25   BUN 4* 4*   CREATININE 0.5* 0.4*   GLUCOSE 149* 105*   MG  --  1.7     PT/INR:No results for input(s): \"PROTIME\", \"INR\" in the last 72 hours.  APTT:No results for input(s): \"APTT\" in the last 72 hours.  LIVER PROFILE:  Recent Labs     09/28/24  1521 09/30/24  0554   AST 60* 40   ALT 36 30   BILITOT 2.7* 2.9*   ALKPHOS 173* 139*       Imaging Last 24 Hours:  XR CHEST PORTABLE    Result Date: 9/28/2024  EXAM: CHEST RADIOGRAPH  TECHNIQUE: Single frontal chest radiograph.  HISTORY: Shortness of breath.  COMPARISON: None.  FINDINGS: The lungs show no consolidation, pleural effusion or pneumothorax.  Mild scarring seen in the right lower lobe.  Cardiomediastinal silhouette and pulmonary vessels are within normal limits.  Upper abdomen is unremarkable.  Eventration of the right hemidiaphragm.  No acute bony abnormality.      1.  No acute cardiopulmonary disease    ______________________________________ Electronically signed by: DENIA CRUZ M.D. Date:     09/28/2024 Time:    16:21     Assessment//Plan           Hospital Problems             Last Modified POA    * (Principal) Carcinoma of liver (HCC) 9/28/2024 Yes     Assessment:    Condition: In unstable condition.  Worsening.   (Patient complains of a lot of discomfort in his abdomen legs and scrotum.  His abdominal exam is quite tight and distended.  Therapeutic paracentesis has been planned for this morning.  He is getting pain medication regularly and will increase his fentanyl now to 25 mics.  He is been getting Dilaudid pushes whenever he can get MRI done about every 3 hours.).     Plan:    (Plan is for paracentesis today for therapeutic effect.  If that can be accomplished the brother plans to take him home where he has been living in his basement with outpatient hospice he is getting a fentanyl patch and has been getting oral medications for pain in anticipation of his discharge to his brother's house.  He is GIP appropriate and

## 2024-11-04 ENCOUNTER — HOSPITAL ENCOUNTER (INPATIENT)
Age: 54
LOS: 9 days | DRG: 951 | End: 2024-11-13
Attending: INTERNAL MEDICINE | Admitting: INTERNAL MEDICINE
Payer: MEDICAID

## 2024-11-04 PROBLEM — C22.9 MALIGNANT NEOPLASM OF LIVER, NOT SPECIFIED AS PRIMARY OR SECONDARY (HCC): Status: ACTIVE | Noted: 2024-11-04

## 2024-11-04 LAB — SARS-COV-2 RDRP RESP QL NAA+PROBE: NOT DETECTED

## 2024-11-04 PROCEDURE — 6370000000 HC RX 637 (ALT 250 FOR IP): Performed by: INTERNAL MEDICINE

## 2024-11-04 PROCEDURE — 6560000002 HC HOSPICE GENERAL INPATIENT

## 2024-11-04 PROCEDURE — 87635 SARS-COV-2 COVID-19 AMP PRB: CPT

## 2024-11-04 RX ORDER — PROCHLORPERAZINE MALEATE 10 MG
10 TABLET ORAL EVERY 6 HOURS PRN
Status: DISCONTINUED | OUTPATIENT
Start: 2024-11-04 | End: 2024-11-07

## 2024-11-04 RX ORDER — RISPERIDONE 1 MG/ML
1 SOLUTION ORAL EVERY 6 HOURS PRN
Status: DISCONTINUED | OUTPATIENT
Start: 2024-11-04 | End: 2024-11-13 | Stop reason: HOSPADM

## 2024-11-04 RX ORDER — LORAZEPAM 2 MG/ML
1 INJECTION INTRAMUSCULAR EVERY 4 HOURS PRN
Status: DISCONTINUED | OUTPATIENT
Start: 2024-11-04 | End: 2024-11-12

## 2024-11-04 RX ORDER — LACTULOSE 10 G/15ML
20 SOLUTION ORAL 2 TIMES DAILY
Status: DISCONTINUED | OUTPATIENT
Start: 2024-11-04 | End: 2024-11-06

## 2024-11-04 RX ORDER — BISACODYL 10 MG
10 SUPPOSITORY, RECTAL RECTAL DAILY PRN
Status: DISCONTINUED | OUTPATIENT
Start: 2024-11-04 | End: 2024-11-13 | Stop reason: HOSPADM

## 2024-11-04 RX ORDER — FUROSEMIDE 40 MG/1
40 TABLET ORAL DAILY
Status: DISCONTINUED | OUTPATIENT
Start: 2024-11-05 | End: 2024-11-07

## 2024-11-04 RX ORDER — ONDANSETRON 4 MG/1
4 TABLET, ORALLY DISINTEGRATING ORAL EVERY 6 HOURS PRN
Status: DISCONTINUED | OUTPATIENT
Start: 2024-11-04 | End: 2024-11-07

## 2024-11-04 RX ORDER — LORAZEPAM 0.5 MG/1
1 TABLET ORAL
Status: DISCONTINUED | OUTPATIENT
Start: 2024-11-04 | End: 2024-11-07

## 2024-11-04 RX ORDER — ONDANSETRON 2 MG/ML
4 INJECTION INTRAMUSCULAR; INTRAVENOUS EVERY 6 HOURS PRN
Status: DISCONTINUED | OUTPATIENT
Start: 2024-11-04 | End: 2024-11-13 | Stop reason: HOSPADM

## 2024-11-04 RX ORDER — FENTANYL 75 UG/1
1 PATCH TRANSDERMAL
Status: DISCONTINUED | OUTPATIENT
Start: 2024-11-05 | End: 2024-11-05

## 2024-11-04 RX ORDER — SPIRONOLACTONE 25 MG/1
25 TABLET ORAL 2 TIMES DAILY
Status: DISCONTINUED | OUTPATIENT
Start: 2024-11-04 | End: 2024-11-07

## 2024-11-04 RX ORDER — HYDROMORPHONE HYDROCHLORIDE 1 MG/ML
1 INJECTION, SOLUTION INTRAMUSCULAR; INTRAVENOUS; SUBCUTANEOUS
Status: DISCONTINUED | OUTPATIENT
Start: 2024-11-04 | End: 2024-11-08

## 2024-11-04 RX ORDER — ACETAMINOPHEN 650 MG/1
650 SUPPOSITORY RECTAL EVERY 6 HOURS PRN
Status: DISCONTINUED | OUTPATIENT
Start: 2024-11-04 | End: 2024-11-13 | Stop reason: HOSPADM

## 2024-11-04 RX ORDER — HYDROMORPHONE HYDROCHLORIDE 1 MG/ML
0.5 INJECTION, SOLUTION INTRAMUSCULAR; INTRAVENOUS; SUBCUTANEOUS
Status: DISCONTINUED | OUTPATIENT
Start: 2024-11-04 | End: 2024-11-08

## 2024-11-04 RX ORDER — HYDROMORPHONE HYDROCHLORIDE 2 MG/1
4 TABLET ORAL
Status: DISCONTINUED | OUTPATIENT
Start: 2024-11-04 | End: 2024-11-07

## 2024-11-04 RX ADMIN — LACTULOSE 20 G: 20 SOLUTION ORAL at 20:59

## 2024-11-04 RX ADMIN — HYDROMORPHONE HYDROCHLORIDE 4 MG: 2 TABLET ORAL at 21:32

## 2024-11-04 RX ADMIN — SPIRONOLACTONE 25 MG: 25 TABLET ORAL at 20:59

## 2024-11-04 RX ADMIN — HYDROMORPHONE HYDROCHLORIDE 4 MG: 2 TABLET ORAL at 16:32

## 2024-11-04 RX ADMIN — ONDANSETRON 4 MG: 4 TABLET, ORALLY DISINTEGRATING ORAL at 21:51

## 2024-11-04 RX ADMIN — LORAZEPAM 1 MG: 0.5 TABLET ORAL at 22:33

## 2024-11-04 RX ADMIN — LORAZEPAM 1 MG: 0.5 TABLET ORAL at 16:00

## 2024-11-04 ASSESSMENT — PAIN DESCRIPTION - LOCATION
LOCATION: BACK;LEG;ABDOMEN
LOCATION: ABDOMEN;LEG;BACK

## 2024-11-04 ASSESSMENT — PAIN SCALES - GENERAL
PAINLEVEL_OUTOF10: 8
PAINLEVEL_OUTOF10: 7
PAINLEVEL_OUTOF10: 8

## 2024-11-04 ASSESSMENT — PAIN DESCRIPTION - DESCRIPTORS
DESCRIPTORS: ACHING;CRAMPING;DISCOMFORT
DESCRIPTORS: ACHING;CRAMPING;STABBING

## 2024-11-04 ASSESSMENT — PAIN DESCRIPTION - ORIENTATION
ORIENTATION: LEFT;RIGHT;LOWER
ORIENTATION: RIGHT;LEFT;LOWER

## 2024-11-04 ASSESSMENT — PAIN DESCRIPTION - PAIN TYPE: TYPE: CHRONIC PAIN;NEUROPATHIC PAIN;DEEP SOMATIC PAIN

## 2024-11-05 PROBLEM — K74.60 ESOPHAGEAL VARICES IN CIRRHOSIS (HCC): Status: ACTIVE | Noted: 2024-11-05

## 2024-11-05 PROBLEM — R45.1 RESTLESSNESS AND AGITATION: Status: ACTIVE | Noted: 2024-11-05

## 2024-11-05 PROBLEM — E46 PROTEIN CALORIE MALNUTRITION (HCC): Status: ACTIVE | Noted: 2024-11-05

## 2024-11-05 PROBLEM — R18.8 ASCITES: Status: ACTIVE | Noted: 2024-11-05

## 2024-11-05 PROBLEM — K76.6 PORTAL HYPERTENSION (HCC): Status: ACTIVE | Noted: 2024-11-05

## 2024-11-05 PROBLEM — D69.6 THROMBOCYTOPENIA (HCC): Status: ACTIVE | Noted: 2024-11-05

## 2024-11-05 PROBLEM — K76.82 HEPATIC ENCEPHALOPATHY (HCC): Status: ACTIVE | Noted: 2024-11-05

## 2024-11-05 PROBLEM — I85.10 ESOPHAGEAL VARICES IN CIRRHOSIS (HCC): Status: ACTIVE | Noted: 2024-11-05

## 2024-11-05 PROBLEM — K74.60 CIRRHOSIS (HCC): Status: ACTIVE | Noted: 2024-11-05

## 2024-11-05 PROBLEM — D53.9 MACROCYTIC ANEMIA: Status: ACTIVE | Noted: 2024-11-05

## 2024-11-05 PROBLEM — B18.2 CHRONIC HEPATITIS C (HCC): Status: ACTIVE | Noted: 2024-11-05

## 2024-11-05 LAB
ALBUMIN SERPL-MCNC: 2.3 G/DL (ref 3.5–5.2)
ALP SERPL-CCNC: 119 U/L (ref 40–129)
ALT SERPL-CCNC: 23 U/L (ref 5–41)
AMMONIA PLAS-SCNC: 72 UMOL/L (ref 16–60)
ANION GAP SERPL CALCULATED.3IONS-SCNC: 9 MMOL/L (ref 7–19)
AST SERPL-CCNC: 47 U/L (ref 5–40)
BILIRUB SERPL-MCNC: 3.4 MG/DL (ref 0.2–1.2)
BUN SERPL-MCNC: 6 MG/DL (ref 6–20)
CALCIUM SERPL-MCNC: 8 MG/DL (ref 8.6–10)
CHLORIDE SERPL-SCNC: 101 MMOL/L (ref 98–111)
CO2 SERPL-SCNC: 27 MMOL/L (ref 22–29)
CREAT SERPL-MCNC: 0.6 MG/DL (ref 0.7–1.2)
GLUCOSE SERPL-MCNC: 154 MG/DL (ref 70–99)
POTASSIUM SERPL-SCNC: 3.3 MMOL/L (ref 3.5–5)
PROT SERPL-MCNC: 6.2 G/DL (ref 6.4–8.3)
SODIUM SERPL-SCNC: 137 MMOL/L (ref 136–145)

## 2024-11-05 PROCEDURE — 82140 ASSAY OF AMMONIA: CPT

## 2024-11-05 PROCEDURE — 6370000000 HC RX 637 (ALT 250 FOR IP): Performed by: INTERNAL MEDICINE

## 2024-11-05 PROCEDURE — 80053 COMPREHEN METABOLIC PANEL: CPT

## 2024-11-05 PROCEDURE — 36415 COLL VENOUS BLD VENIPUNCTURE: CPT

## 2024-11-05 PROCEDURE — 6560000002 HC HOSPICE GENERAL INPATIENT

## 2024-11-05 RX ORDER — ACETAMINOPHEN 325 MG/1
650 TABLET ORAL EVERY 4 HOURS PRN
Status: DISCONTINUED | OUTPATIENT
Start: 2024-11-05 | End: 2024-11-07

## 2024-11-05 RX ORDER — FENTANYL 100 UG/1
1 PATCH TRANSDERMAL
Status: DISCONTINUED | OUTPATIENT
Start: 2024-11-05 | End: 2024-11-13 | Stop reason: HOSPADM

## 2024-11-05 RX ADMIN — HYDROMORPHONE HYDROCHLORIDE 4 MG: 2 TABLET ORAL at 18:18

## 2024-11-05 RX ADMIN — SPIRONOLACTONE 25 MG: 25 TABLET ORAL at 18:18

## 2024-11-05 RX ADMIN — LACTULOSE 20 G: 20 SOLUTION ORAL at 23:15

## 2024-11-05 RX ADMIN — HYDROMORPHONE HYDROCHLORIDE 4 MG: 2 TABLET ORAL at 06:56

## 2024-11-05 RX ADMIN — Medication 1 MG: at 03:42

## 2024-11-05 RX ADMIN — ONDANSETRON 4 MG: 4 TABLET, ORALLY DISINTEGRATING ORAL at 11:16

## 2024-11-05 RX ADMIN — SPIRONOLACTONE 25 MG: 25 TABLET ORAL at 08:17

## 2024-11-05 RX ADMIN — HYDROMORPHONE HYDROCHLORIDE 4 MG: 2 TABLET ORAL at 09:51

## 2024-11-05 RX ADMIN — FUROSEMIDE 40 MG: 40 TABLET ORAL at 08:17

## 2024-11-05 RX ADMIN — ACETAMINOPHEN 650 MG: 325 TABLET ORAL at 03:42

## 2024-11-05 RX ADMIN — Medication 1 MG: at 09:52

## 2024-11-05 RX ADMIN — LACTULOSE 20 G: 20 SOLUTION ORAL at 08:18

## 2024-11-05 RX ADMIN — HYDROMORPHONE HYDROCHLORIDE 4 MG: 2 TABLET ORAL at 01:27

## 2024-11-05 RX ADMIN — HYDROMORPHONE HYDROCHLORIDE 4 MG: 2 TABLET ORAL at 23:14

## 2024-11-05 RX ADMIN — Medication 1 MG: at 23:15

## 2024-11-05 RX ADMIN — LORAZEPAM 1 MG: 0.5 TABLET ORAL at 18:18

## 2024-11-05 ASSESSMENT — PAIN - FUNCTIONAL ASSESSMENT: PAIN_FUNCTIONAL_ASSESSMENT: PREVENTS OR INTERFERES SOME ACTIVE ACTIVITIES AND ADLS

## 2024-11-05 ASSESSMENT — PAIN DESCRIPTION - DESCRIPTORS
DESCRIPTORS: ACHING;DISCOMFORT
DESCRIPTORS: ACHING
DESCRIPTORS: ACHING

## 2024-11-05 ASSESSMENT — PAIN SCALES - GENERAL
PAINLEVEL_OUTOF10: 9
PAINLEVEL_OUTOF10: 8

## 2024-11-05 ASSESSMENT — PAIN DESCRIPTION - LOCATION
LOCATION: ABDOMEN;GENERALIZED;BACK
LOCATION: ABDOMEN;BACK
LOCATION: BACK

## 2024-11-05 ASSESSMENT — PAIN DESCRIPTION - PAIN TYPE: TYPE: CHRONIC PAIN

## 2024-11-05 ASSESSMENT — PAIN DESCRIPTION - ONSET: ONSET: AWAKENED FROM SLEEP

## 2024-11-05 ASSESSMENT — PAIN DESCRIPTION - FREQUENCY: FREQUENCY: INTERMITTENT

## 2024-11-05 NOTE — H&P
specified as primary or secondary (HCC) 11/5/2024 Yes    Cirrhosis (HCC) 11/5/2024 Yes    Chronic hepatitis C (HCC) 11/5/2024 Yes    Portal hypertension (HCC) 11/5/2024 Yes    Esophageal varices in cirrhosis (HCC) 11/5/2024 Yes    Thrombocytopenia (HCC) 11/5/2024 Yes    Macrocytic anemia 11/5/2024 Yes    Ascites 11/5/2024 Yes    Protein calorie malnutrition (HCC) 11/5/2024 Yes    Hepatic encephalopathy (HCC) 11/5/2024 Yes       Plan   Check ammonia and CMP  Continue lactulose adjusting as needed  Continue spironolactone and furosemide for his ascites  Oral or IV Dilaudid as needed for pain  Continue fentanyl patch increasing to 100 mcg  Ativan for anxiety  Risperdal for agitation  Treat nausea and vomiting as needed  Consider issues with disposition and start working towards possible placement assuming he stabilizes  He is GIP appropriate     Electronically signed by Alex Chang MD on 11/5/24 at 8:50 AM CST

## 2024-11-06 PROCEDURE — 3E033GC INTRODUCTION OF OTHER THERAPEUTIC SUBSTANCE INTO PERIPHERAL VEIN, PERCUTANEOUS APPROACH: ICD-10-PCS | Performed by: INTERNAL MEDICINE

## 2024-11-06 PROCEDURE — 2580000003 HC RX 258: Performed by: INTERNAL MEDICINE

## 2024-11-06 PROCEDURE — 6370000000 HC RX 637 (ALT 250 FOR IP): Performed by: INTERNAL MEDICINE

## 2024-11-06 PROCEDURE — 6360000002 HC RX W HCPCS: Performed by: INTERNAL MEDICINE

## 2024-11-06 PROCEDURE — 6560000002 HC HOSPICE GENERAL INPATIENT

## 2024-11-06 RX ORDER — NEOMYCIN SULFATE 500 MG/1
500 TABLET ORAL EVERY 12 HOURS
Status: DISCONTINUED | OUTPATIENT
Start: 2024-11-06 | End: 2024-11-07

## 2024-11-06 RX ORDER — QUETIAPINE FUMARATE 50 MG/1
50 TABLET, FILM COATED ORAL NIGHTLY
Status: DISCONTINUED | OUTPATIENT
Start: 2024-11-06 | End: 2024-11-07

## 2024-11-06 RX ORDER — HYDROMORPHONE HYDROCHLORIDE 1 MG/ML
1 INJECTION, SOLUTION INTRAMUSCULAR; INTRAVENOUS; SUBCUTANEOUS
Status: DISCONTINUED | OUTPATIENT
Start: 2024-11-06 | End: 2024-11-13 | Stop reason: HOSPADM

## 2024-11-06 RX ORDER — SODIUM CHLORIDE 0.9 % (FLUSH) 0.9 %
5-40 SYRINGE (ML) INJECTION PRN
Status: DISCONTINUED | OUTPATIENT
Start: 2024-11-06 | End: 2024-11-13 | Stop reason: HOSPADM

## 2024-11-06 RX ORDER — POTASSIUM CHLORIDE 750 MG/1
20 TABLET, EXTENDED RELEASE ORAL DAILY
Status: DISCONTINUED | OUTPATIENT
Start: 2024-11-06 | End: 2024-11-07

## 2024-11-06 RX ORDER — SODIUM CHLORIDE 9 MG/ML
INJECTION, SOLUTION INTRAVENOUS CONTINUOUS
Status: DISCONTINUED | OUTPATIENT
Start: 2024-11-06 | End: 2024-11-08

## 2024-11-06 RX ORDER — LACTULOSE 10 G/15ML
20 SOLUTION ORAL 3 TIMES DAILY
Status: DISCONTINUED | OUTPATIENT
Start: 2024-11-06 | End: 2024-11-07

## 2024-11-06 RX ADMIN — LORAZEPAM 1 MG: 0.5 TABLET ORAL at 10:03

## 2024-11-06 RX ADMIN — Medication 1 MG: at 09:26

## 2024-11-06 RX ADMIN — LORAZEPAM 1 MG: 0.5 TABLET ORAL at 05:54

## 2024-11-06 RX ADMIN — SPIRONOLACTONE 25 MG: 25 TABLET ORAL at 07:49

## 2024-11-06 RX ADMIN — LACTULOSE 20 G: 20 SOLUTION ORAL at 19:40

## 2024-11-06 RX ADMIN — Medication 1 MG/HR: at 19:12

## 2024-11-06 RX ADMIN — HYDROMORPHONE HYDROCHLORIDE 1 MG: 1 INJECTION, SOLUTION INTRAMUSCULAR; INTRAVENOUS; SUBCUTANEOUS at 16:33

## 2024-11-06 RX ADMIN — HYDROMORPHONE HYDROCHLORIDE 4 MG: 2 TABLET ORAL at 05:51

## 2024-11-06 RX ADMIN — HYDROMORPHONE HYDROCHLORIDE 4 MG: 2 TABLET ORAL at 09:26

## 2024-11-06 RX ADMIN — HYDROMORPHONE HYDROCHLORIDE 1 MG: 1 INJECTION, SOLUTION INTRAMUSCULAR; INTRAVENOUS; SUBCUTANEOUS at 10:45

## 2024-11-06 RX ADMIN — LORAZEPAM 1 MG: 2 INJECTION INTRAMUSCULAR; INTRAVENOUS at 13:10

## 2024-11-06 RX ADMIN — FUROSEMIDE 40 MG: 40 TABLET ORAL at 07:49

## 2024-11-06 RX ADMIN — SPIRONOLACTONE 25 MG: 25 TABLET ORAL at 17:57

## 2024-11-06 RX ADMIN — NEOMYCIN SULFATE 500 MG: 500 TABLET ORAL at 19:40

## 2024-11-06 RX ADMIN — NEOMYCIN SULFATE 500 MG: 500 TABLET ORAL at 10:03

## 2024-11-06 RX ADMIN — LACTULOSE 20 G: 20 SOLUTION ORAL at 07:49

## 2024-11-06 RX ADMIN — QUETIAPINE FUMARATE 50 MG: 50 TABLET ORAL at 19:40

## 2024-11-06 RX ADMIN — HYDROMORPHONE HYDROCHLORIDE 1 MG: 1 INJECTION, SOLUTION INTRAMUSCULAR; INTRAVENOUS; SUBCUTANEOUS at 18:26

## 2024-11-06 RX ADMIN — LACTULOSE 20 G: 20 SOLUTION ORAL at 13:06

## 2024-11-06 RX ADMIN — SODIUM CHLORIDE: 9 INJECTION, SOLUTION INTRAVENOUS at 19:12

## 2024-11-06 RX ADMIN — POTASSIUM CHLORIDE 20 MEQ: 750 TABLET, EXTENDED RELEASE ORAL at 13:06

## 2024-11-06 RX ADMIN — LORAZEPAM 1 MG: 0.5 TABLET ORAL at 17:57

## 2024-11-06 ASSESSMENT — PAIN - FUNCTIONAL ASSESSMENT: PAIN_FUNCTIONAL_ASSESSMENT: PREVENTS OR INTERFERES SOME ACTIVE ACTIVITIES AND ADLS

## 2024-11-06 ASSESSMENT — PAIN DESCRIPTION - LOCATION: LOCATION: BACK;LEG

## 2024-11-06 ASSESSMENT — PAIN SCALES - GENERAL
PAINLEVEL_OUTOF10: 8
PAINLEVEL_OUTOF10: 8

## 2024-11-06 ASSESSMENT — PAIN DESCRIPTION - DESCRIPTORS: DESCRIPTORS: CRAMPING;SHOOTING;STABBING

## 2024-11-06 ASSESSMENT — PAIN DESCRIPTION - ORIENTATION: ORIENTATION: RIGHT;LEFT

## 2024-11-07 PROCEDURE — 6360000002 HC RX W HCPCS: Performed by: INTERNAL MEDICINE

## 2024-11-07 PROCEDURE — 2580000003 HC RX 258: Performed by: INTERNAL MEDICINE

## 2024-11-07 PROCEDURE — 6560000002 HC HOSPICE GENERAL INPATIENT

## 2024-11-07 RX ADMIN — LORAZEPAM 1 MG: 2 INJECTION INTRAMUSCULAR; INTRAVENOUS at 20:36

## 2024-11-07 RX ADMIN — Medication 1.75 MG/HR: at 16:00

## 2024-11-07 RX ADMIN — Medication 1.5 MG/HR: at 06:05

## 2024-11-07 RX ADMIN — SODIUM CHLORIDE: 9 INJECTION, SOLUTION INTRAVENOUS at 20:22

## 2024-11-07 RX ADMIN — Medication 1.75 MG/HR: at 11:04

## 2024-11-07 ASSESSMENT — PAIN SCALES - GENERAL
PAINLEVEL_OUTOF10: 2
PAINLEVEL_OUTOF10: 0
PAINLEVEL_OUTOF10: 5

## 2024-11-07 ASSESSMENT — PAIN DESCRIPTION - LOCATION
LOCATION: GENERALIZED;ABDOMEN;BACK
LOCATION: BACK;ABDOMEN
LOCATION: ABDOMEN;BACK;GENERALIZED

## 2024-11-07 ASSESSMENT — PAIN DESCRIPTION - DESCRIPTORS
DESCRIPTORS: PATIENT UNABLE TO DESCRIBE
DESCRIPTORS: PATIENT UNABLE TO DESCRIBE

## 2024-11-08 PROCEDURE — 6360000002 HC RX W HCPCS: Performed by: INTERNAL MEDICINE

## 2024-11-08 PROCEDURE — 2709999900 HC NON-CHARGEABLE SUPPLY

## 2024-11-08 PROCEDURE — 76937 US GUIDE VASCULAR ACCESS: CPT

## 2024-11-08 PROCEDURE — 36415 COLL VENOUS BLD VENIPUNCTURE: CPT

## 2024-11-08 PROCEDURE — 6560000002 HC HOSPICE GENERAL INPATIENT

## 2024-11-08 PROCEDURE — 6370000000 HC RX 637 (ALT 250 FOR IP): Performed by: INTERNAL MEDICINE

## 2024-11-08 RX ORDER — LACTULOSE 10 G/15ML
20 SOLUTION ORAL 3 TIMES DAILY
Status: DISCONTINUED | OUTPATIENT
Start: 2024-11-08 | End: 2024-11-13 | Stop reason: HOSPADM

## 2024-11-08 RX ORDER — RISPERIDONE 1 MG/ML
1 SOLUTION ORAL NIGHTLY
Status: DISCONTINUED | OUTPATIENT
Start: 2024-11-08 | End: 2024-11-13 | Stop reason: HOSPADM

## 2024-11-08 RX ORDER — NEOMYCIN SULFATE 500 MG/1
500 TABLET ORAL EVERY 12 HOURS
Status: DISCONTINUED | OUTPATIENT
Start: 2024-11-08 | End: 2024-11-13 | Stop reason: HOSPADM

## 2024-11-08 RX ADMIN — LACTULOSE 20 G: 20 SOLUTION ORAL at 13:22

## 2024-11-08 RX ADMIN — Medication 3.75 MG/HR: at 21:10

## 2024-11-08 RX ADMIN — Medication 2 MG/HR: at 00:28

## 2024-11-08 RX ADMIN — LACTULOSE 20 G: 20 SOLUTION ORAL at 10:46

## 2024-11-08 RX ADMIN — HYDROMORPHONE HYDROCHLORIDE 1 MG: 1 INJECTION, SOLUTION INTRAMUSCULAR; INTRAVENOUS; SUBCUTANEOUS at 18:11

## 2024-11-08 RX ADMIN — Medication 1 MG: at 02:24

## 2024-11-08 RX ADMIN — LORAZEPAM 1 MG: 2 INJECTION INTRAMUSCULAR; INTRAVENOUS at 02:01

## 2024-11-08 RX ADMIN — NEOMYCIN SULFATE 500 MG: 500 TABLET ORAL at 10:46

## 2024-11-08 RX ADMIN — LORAZEPAM 1 MG: 2 INJECTION INTRAMUSCULAR; INTRAVENOUS at 18:11

## 2024-11-08 RX ADMIN — Medication 2.75 MG/HR: at 09:31

## 2024-11-08 RX ADMIN — Medication 3.25 MG/HR: at 16:36

## 2024-11-08 ASSESSMENT — PAIN SCALES - GENERAL
PAINLEVEL_OUTOF10: 0
PAINLEVEL_OUTOF10: 8
PAINLEVEL_OUTOF10: 0
PAINLEVEL_OUTOF10: 8
PAINLEVEL_OUTOF10: 8

## 2024-11-08 ASSESSMENT — PAIN DESCRIPTION - LOCATION
LOCATION: BACK

## 2024-11-08 NOTE — CONSULTS
[facility] Vascular Access Team:  Consult Note    Patient: Philip Young  YOB: 1970   MRN: [id number]  Room: [place] [address]     Attending Physician: Alex Chang MD  Ordering Physician: Alex Chang MD    Diagnosis:   Malignant neoplasm of liver, not specified as primary or secondary [C22.9]  Malignant neoplasm of liver, not specified as primary or secondary (HCC) [C22.9]    Active LDAs:  Peripheral IV 11/06/24 Left;Proximal;Anterior Forearm (Active)   Number of days: 1       Peripheral IV 11/08/24 Distal;Right;Anterior Forearm (Active)   Number of days: 0       Reason for Consult:  [facility] vascular access team consulted for placement of Ultrasound Guided Peripheral IV.    Indication(s):  Philip Young is a 54 y.o. male admitted to [place] [address] for Restlessness and agitation. Philip Young currently has a 22 gauge PIV in his left forearm that this nurse placed on 11/6. Hospice nurse states the IV pump has frequent occlusion alarms without obvious cause.     Findings:  Assessed patient's LUE and found the IV site to be clean and dry, dressing is lifting on corner possibly contributing to the alarms. Went ahead and placed a new 22 gauge ultrasound guided peripheral IV in the patient's right forearm with one attempt and no complications. Patient tolerated very well.     Impression/Plan:  1. Ultrasound-Guided Peripheral IV is ready to be used    Thank you for your time and consult.     Electronically Signed By: Dagoberto Mata RN on 11/8/2024 at 11:16 AM

## 2024-11-09 PROCEDURE — 6360000002 HC RX W HCPCS: Performed by: INTERNAL MEDICINE

## 2024-11-09 PROCEDURE — 2580000003 HC RX 258: Performed by: INTERNAL MEDICINE

## 2024-11-09 PROCEDURE — 6560000002 HC HOSPICE GENERAL INPATIENT

## 2024-11-09 PROCEDURE — 6370000000 HC RX 637 (ALT 250 FOR IP): Performed by: FAMILY MEDICINE

## 2024-11-09 PROCEDURE — 6360000002 HC RX W HCPCS: Performed by: FAMILY MEDICINE

## 2024-11-09 RX ORDER — SCOLOPAMINE TRANSDERMAL SYSTEM 1 MG/1
1 PATCH, EXTENDED RELEASE TRANSDERMAL
Status: DISCONTINUED | OUTPATIENT
Start: 2024-11-09 | End: 2024-11-12

## 2024-11-09 RX ORDER — GLYCOPYRROLATE 0.2 MG/ML
0.2 INJECTION INTRAMUSCULAR; INTRAVENOUS EVERY 4 HOURS PRN
Status: DISCONTINUED | OUTPATIENT
Start: 2024-11-09 | End: 2024-11-13 | Stop reason: HOSPADM

## 2024-11-09 RX ADMIN — HYDROMORPHONE HYDROCHLORIDE 1 MG: 1 INJECTION, SOLUTION INTRAMUSCULAR; INTRAVENOUS; SUBCUTANEOUS at 07:44

## 2024-11-09 RX ADMIN — LORAZEPAM 1 MG: 2 INJECTION INTRAMUSCULAR; INTRAVENOUS at 18:05

## 2024-11-09 RX ADMIN — HYDROMORPHONE HYDROCHLORIDE 4.5 MG/HR: 10 INJECTION, SOLUTION INTRAMUSCULAR; INTRAVENOUS; SUBCUTANEOUS at 21:50

## 2024-11-09 RX ADMIN — GLYCOPYRROLATE 0.2 MG: 0.2 INJECTION INTRAMUSCULAR; INTRAVENOUS at 16:21

## 2024-11-09 RX ADMIN — GLYCOPYRROLATE 0.2 MG: 0.2 INJECTION INTRAMUSCULAR; INTRAVENOUS at 11:48

## 2024-11-09 RX ADMIN — Medication 4 MG/HR: at 07:42

## 2024-11-09 RX ADMIN — Medication 4 MG/HR: at 12:30

## 2024-11-09 RX ADMIN — LORAZEPAM 1 MG: 2 INJECTION INTRAMUSCULAR; INTRAVENOUS at 07:44

## 2024-11-09 RX ADMIN — HYDROMORPHONE HYDROCHLORIDE 1 MG: 1 INJECTION, SOLUTION INTRAMUSCULAR; INTRAVENOUS; SUBCUTANEOUS at 18:05

## 2024-11-09 RX ADMIN — Medication 4.5 MG/HR: at 16:58

## 2024-11-09 RX ADMIN — Medication 3.75 MG/HR: at 02:51

## 2024-11-09 ASSESSMENT — PAIN SCALES - GENERAL
PAINLEVEL_OUTOF10: 0
PAINLEVEL_OUTOF10: 0

## 2024-11-10 PROCEDURE — 6360000002 HC RX W HCPCS: Performed by: INTERNAL MEDICINE

## 2024-11-10 PROCEDURE — 6370000000 HC RX 637 (ALT 250 FOR IP): Performed by: INTERNAL MEDICINE

## 2024-11-10 PROCEDURE — 6560000002 HC HOSPICE GENERAL INPATIENT

## 2024-11-10 PROCEDURE — 2580000003 HC RX 258: Performed by: INTERNAL MEDICINE

## 2024-11-10 RX ADMIN — HYDROMORPHONE HYDROCHLORIDE 1 MG: 1 INJECTION, SOLUTION INTRAMUSCULAR; INTRAVENOUS; SUBCUTANEOUS at 04:57

## 2024-11-10 RX ADMIN — HYDROMORPHONE HYDROCHLORIDE 5 MG/HR: 10 INJECTION, SOLUTION INTRAMUSCULAR; INTRAVENOUS; SUBCUTANEOUS at 21:18

## 2024-11-10 RX ADMIN — LACTULOSE 20 G: 20 SOLUTION ORAL at 13:43

## 2024-11-10 RX ADMIN — ACETAMINOPHEN 650 MG: 650 SUPPOSITORY RECTAL at 13:34

## 2024-11-10 RX ADMIN — LORAZEPAM 1 MG: 2 INJECTION INTRAMUSCULAR; INTRAVENOUS at 04:57

## 2024-11-10 RX ADMIN — LACTULOSE 20 G: 20 SOLUTION ORAL at 09:05

## 2024-11-10 RX ADMIN — HYDROMORPHONE HYDROCHLORIDE 4.75 MG/HR: 10 INJECTION, SOLUTION INTRAMUSCULAR; INTRAVENOUS; SUBCUTANEOUS at 10:30

## 2024-11-10 ASSESSMENT — PAIN SCALES - GENERAL
PAINLEVEL_OUTOF10: 0
PAINLEVEL_OUTOF10: 8
PAINLEVEL_OUTOF10: 8
PAINLEVEL_OUTOF10: 0
PAINLEVEL_OUTOF10: 0

## 2024-11-11 PROCEDURE — 6360000002 HC RX W HCPCS: Performed by: INTERNAL MEDICINE

## 2024-11-11 PROCEDURE — 6370000000 HC RX 637 (ALT 250 FOR IP): Performed by: INTERNAL MEDICINE

## 2024-11-11 PROCEDURE — 2580000003 HC RX 258: Performed by: INTERNAL MEDICINE

## 2024-11-11 PROCEDURE — 6360000002 HC RX W HCPCS: Performed by: FAMILY MEDICINE

## 2024-11-11 PROCEDURE — 6560000002 HC HOSPICE GENERAL INPATIENT

## 2024-11-11 RX ADMIN — SODIUM CHLORIDE, PRESERVATIVE FREE 10 ML: 5 INJECTION INTRAVENOUS at 16:17

## 2024-11-11 RX ADMIN — HYDROMORPHONE HYDROCHLORIDE 5.5 MG/HR: 10 INJECTION, SOLUTION INTRAMUSCULAR; INTRAVENOUS; SUBCUTANEOUS at 08:11

## 2024-11-11 RX ADMIN — LORAZEPAM 1 MG: 2 INJECTION INTRAMUSCULAR; INTRAVENOUS at 16:17

## 2024-11-11 RX ADMIN — HYDROMORPHONE HYDROCHLORIDE 1 MG: 1 INJECTION, SOLUTION INTRAMUSCULAR; INTRAVENOUS; SUBCUTANEOUS at 16:17

## 2024-11-11 RX ADMIN — GLYCOPYRROLATE 0.2 MG: 0.2 INJECTION INTRAMUSCULAR; INTRAVENOUS at 16:17

## 2024-11-11 RX ADMIN — HYDROMORPHONE HYDROCHLORIDE 6.5 MG/HR: 10 INJECTION, SOLUTION INTRAMUSCULAR; INTRAVENOUS; SUBCUTANEOUS at 17:31

## 2024-11-11 RX ADMIN — HYDROMORPHONE HYDROCHLORIDE 1 MG: 1 INJECTION, SOLUTION INTRAMUSCULAR; INTRAVENOUS; SUBCUTANEOUS at 22:09

## 2024-11-11 RX ADMIN — LORAZEPAM 1 MG: 2 INJECTION INTRAMUSCULAR; INTRAVENOUS at 22:09

## 2024-11-11 RX ADMIN — LORAZEPAM 1 MG: 2 INJECTION INTRAMUSCULAR; INTRAVENOUS at 02:46

## 2024-11-11 ASSESSMENT — PAIN SCALES - GENERAL
PAINLEVEL_OUTOF10: 0
PAINLEVEL_OUTOF10: 5
PAINLEVEL_OUTOF10: 6
PAINLEVEL_OUTOF10: 5
PAINLEVEL_OUTOF10: 0
PAINLEVEL_OUTOF10: 5
PAINLEVEL_OUTOF10: 0
PAINLEVEL_OUTOF10: 0

## 2024-11-11 ASSESSMENT — PAIN DESCRIPTION - DESCRIPTORS
DESCRIPTORS: PATIENT UNABLE TO DESCRIBE

## 2024-11-11 ASSESSMENT — PAIN DESCRIPTION - LOCATION
LOCATION: ABDOMEN;BACK;GENERALIZED
LOCATION: BACK
LOCATION: BACK;GENERALIZED

## 2024-11-11 ASSESSMENT — ENCOUNTER SYMPTOMS
ABDOMINAL DISTENTION: 1
SHORTNESS OF BREATH: 0
VOMITING: 0
DIARRHEA: 0
TROUBLE SWALLOWING: 1
ABDOMINAL PAIN: 1

## 2024-11-12 PROCEDURE — 6560000002 HC HOSPICE GENERAL INPATIENT

## 2024-11-12 PROCEDURE — 6360000002 HC RX W HCPCS: Performed by: INTERNAL MEDICINE

## 2024-11-12 PROCEDURE — 2580000003 HC RX 258: Performed by: INTERNAL MEDICINE

## 2024-11-12 PROCEDURE — 2700000000 HC OXYGEN THERAPY PER DAY

## 2024-11-12 PROCEDURE — 6360000002 HC RX W HCPCS: Performed by: FAMILY MEDICINE

## 2024-11-12 PROCEDURE — 6370000000 HC RX 637 (ALT 250 FOR IP): Performed by: INTERNAL MEDICINE

## 2024-11-12 RX ORDER — SCOLOPAMINE TRANSDERMAL SYSTEM 1 MG/1
1 PATCH, EXTENDED RELEASE TRANSDERMAL
Status: DISCONTINUED | OUTPATIENT
Start: 2024-11-12 | End: 2024-11-12

## 2024-11-12 RX ORDER — SCOLOPAMINE TRANSDERMAL SYSTEM 1 MG/1
3 PATCH, EXTENDED RELEASE TRANSDERMAL
Status: DISCONTINUED | OUTPATIENT
Start: 2024-11-12 | End: 2024-11-12

## 2024-11-12 RX ORDER — MIDAZOLAM HYDROCHLORIDE 1 MG/ML
1 INJECTION, SOLUTION INTRAVENOUS CONTINUOUS
Status: DISCONTINUED | OUTPATIENT
Start: 2024-11-12 | End: 2024-11-13 | Stop reason: HOSPADM

## 2024-11-12 RX ORDER — LORAZEPAM 2 MG/ML
1 INJECTION INTRAMUSCULAR
Status: DISCONTINUED | OUTPATIENT
Start: 2024-11-12 | End: 2024-11-13 | Stop reason: HOSPADM

## 2024-11-12 RX ORDER — SCOLOPAMINE TRANSDERMAL SYSTEM 1 MG/1
3 PATCH, EXTENDED RELEASE TRANSDERMAL
Status: DISCONTINUED | OUTPATIENT
Start: 2024-11-12 | End: 2024-11-13 | Stop reason: HOSPADM

## 2024-11-12 RX ADMIN — HYDROMORPHONE HYDROCHLORIDE 8.5 MG/HR: 10 INJECTION, SOLUTION INTRAMUSCULAR; INTRAVENOUS; SUBCUTANEOUS at 16:25

## 2024-11-12 RX ADMIN — SODIUM CHLORIDE, PRESERVATIVE FREE 10 ML: 5 INJECTION INTRAVENOUS at 08:33

## 2024-11-12 RX ADMIN — LORAZEPAM 1 MG: 2 INJECTION INTRAMUSCULAR; INTRAVENOUS at 04:01

## 2024-11-12 RX ADMIN — SODIUM CHLORIDE, PRESERVATIVE FREE 10 ML: 5 INJECTION INTRAVENOUS at 14:50

## 2024-11-12 RX ADMIN — GLYCOPYRROLATE 0.2 MG: 0.2 INJECTION INTRAMUSCULAR; INTRAVENOUS at 20:16

## 2024-11-12 RX ADMIN — HYDROMORPHONE HYDROCHLORIDE 8.75 MG/HR: 10 INJECTION, SOLUTION INTRAMUSCULAR; INTRAVENOUS; SUBCUTANEOUS at 22:57

## 2024-11-12 RX ADMIN — HYDROMORPHONE HYDROCHLORIDE 6.75 MG/HR: 10 INJECTION, SOLUTION INTRAMUSCULAR; INTRAVENOUS; SUBCUTANEOUS at 02:56

## 2024-11-12 RX ADMIN — HYDROMORPHONE HYDROCHLORIDE 1 MG: 1 INJECTION, SOLUTION INTRAMUSCULAR; INTRAVENOUS; SUBCUTANEOUS at 20:15

## 2024-11-12 RX ADMIN — ONDANSETRON 4 MG: 2 INJECTION INTRAMUSCULAR; INTRAVENOUS at 20:15

## 2024-11-12 RX ADMIN — GLYCOPYRROLATE 0.2 MG: 0.2 INJECTION INTRAMUSCULAR; INTRAVENOUS at 14:50

## 2024-11-12 RX ADMIN — HYDROMORPHONE HYDROCHLORIDE 1 MG: 1 INJECTION, SOLUTION INTRAMUSCULAR; INTRAVENOUS; SUBCUTANEOUS at 04:01

## 2024-11-12 RX ADMIN — MIDAZOLAM IN SODIUM CHLORIDE 1 MG/HR: 1 INJECTION INTRAVENOUS at 09:40

## 2024-11-12 RX ADMIN — GLYCOPYRROLATE 0.2 MG: 0.2 INJECTION INTRAMUSCULAR; INTRAVENOUS at 08:33

## 2024-11-12 RX ADMIN — HYDROMORPHONE HYDROCHLORIDE 7.5 MG/HR: 10 INJECTION, SOLUTION INTRAMUSCULAR; INTRAVENOUS; SUBCUTANEOUS at 08:33

## 2024-11-12 ASSESSMENT — PAIN DESCRIPTION - DESCRIPTORS
DESCRIPTORS: PATIENT UNABLE TO DESCRIBE

## 2024-11-12 ASSESSMENT — PAIN SCALES - GENERAL
PAINLEVEL_OUTOF10: 5
PAINLEVEL_OUTOF10: 0
PAINLEVEL_OUTOF10: 2
PAINLEVEL_OUTOF10: 5
PAINLEVEL_OUTOF10: 0
PAINLEVEL_OUTOF10: 3
PAINLEVEL_OUTOF10: 5
PAINLEVEL_OUTOF10: 2

## 2024-11-12 ASSESSMENT — PAIN DESCRIPTION - LOCATION
LOCATION: ABDOMEN;BACK;GENERALIZED
LOCATION: BACK;ABDOMEN;GENERALIZED
LOCATION: ABDOMEN;BACK
LOCATION: ABDOMEN;BACK;GENERALIZED

## 2024-11-12 ASSESSMENT — ENCOUNTER SYMPTOMS
VOMITING: 0
DIARRHEA: 0
ABDOMINAL DISTENTION: 1

## 2024-11-12 NOTE — PRE-PROCEDURE INSTRUCTIONS
Pt provided with incontinence care of urine as Pena continues to leak.  Pena flushed and repositioned in leg securement device.  Pt turned and repositioned to his back with pillows used to support body, extremities and to off load heels.  Pt had restlessness during care.  When care completed and Pt at rest he was resting comfortably with FLACC of 0, no restlessness, and had no symptoms of distress.  Safety measures in place.

## 2024-11-13 VITALS
RESPIRATION RATE: 28 BRPM | TEMPERATURE: 98.6 F | SYSTOLIC BLOOD PRESSURE: 99 MMHG | DIASTOLIC BLOOD PRESSURE: 48 MMHG | HEART RATE: 127 BPM | OXYGEN SATURATION: 41 %

## 2024-11-13 PROCEDURE — 6360000002 HC RX W HCPCS: Performed by: INTERNAL MEDICINE

## 2024-11-13 PROCEDURE — 6360000002 HC RX W HCPCS: Performed by: FAMILY MEDICINE

## 2024-11-13 PROCEDURE — 2580000003 HC RX 258: Performed by: INTERNAL MEDICINE

## 2024-11-13 RX ADMIN — GLYCOPYRROLATE 0.2 MG: 0.2 INJECTION INTRAMUSCULAR; INTRAVENOUS at 05:05

## 2024-11-13 RX ADMIN — HYDROMORPHONE HYDROCHLORIDE 8.75 MG/HR: 10 INJECTION, SOLUTION INTRAMUSCULAR; INTRAVENOUS; SUBCUTANEOUS at 04:59

## 2024-11-13 ASSESSMENT — PAIN SCALES - GENERAL
PAINLEVEL_OUTOF10: 0

## 2024-11-13 NOTE — PROGRESS NOTES
HCC - Progress Note       Admit Date: 11/4/2024     Hospital Day: 4      Interval History: He deteriorated considerably yesterday with increased agitation and restlessness and despite multiple interventions including IV and oral Dilaudid, Ativan, Risperdal we were unable to control his pain or get him calm and and ultimately had to start Dilaudid drip.  This has helped immensely and he is now on 1.5 mg/h but has become less responsive.       Interval ROS:     Review of Systems   Unable to perform ROS: Patient nonverbal   Constitutional:  Negative for chills and fever.   HENT:  Negative for trouble swallowing.    Respiratory:  Negative for cough and shortness of breath.    Cardiovascular:  Negative for leg swelling.   Gastrointestinal:  Positive for abdominal distention and abdominal pain. Negative for diarrhea and vomiting.   Genitourinary:         He has a Pena catheter.  He tends to tug on it and has a little bit of a skin tear at the urethral opening   Neurological:  Positive for weakness. Negative for seizures.   Psychiatric/Behavioral:  Positive for confusion. Negative for agitation and hallucinations.         Past Medical History  Past Medical History:   Diagnosis Date    Chronic hepatitis C (HCC) 11/05/2024    Cirrhosis (HCC) 11/05/2024    Esophageal varices in cirrhosis (HCC)     History of rectal bleeding     Liver cancer (HCC)     Portal hypertension (HCC) 11/05/2024       Meds: Reviewed    ContinuousInfusions:    HYDROmorphone 1.5 mg/hr (11/07/24 0605)    sodium chloride 10 mL/hr at 11/06/24 1912       Scheduled Meds:     lactulose  20 g Oral TID    neomycin  500 mg Oral Q12H    QUEtiapine  50 mg Oral Nightly    potassium chloride  20 mEq Oral Daily    fentaNYL  1 patch TransDERmal Q72H    spironolactone  25 mg Oral BID    furosemide  40 mg Oral Daily       PRN Meds:  sodium chloride flush, HYDROmorphone, acetaminophen, bisacodyl, acetaminophen, 
                                                    HCC - Progress Note       Admit Date: 11/4/2024     Hospital Day: 5      Interval History: He became more active and restless yesterday and actually had to sit in a recliner at the nurses desk for a period of time.  He is intermittently awake and restless in bed now and his Dilaudid has been increased to 3 mg an hour.  He remains very confused and complains of continued lower back pain in particular this morning.  Has had no nausea or vomiting.        Interval ROS:     Review of Systems   Unable to perform ROS: Patient nonverbal   Constitutional:  Negative for chills and fever.   HENT:  Negative for trouble swallowing.    Respiratory:  Negative for cough and shortness of breath.    Cardiovascular:  Negative for leg swelling.   Gastrointestinal:  Positive for abdominal distention and abdominal pain. Negative for diarrhea and vomiting.   Genitourinary:         He has a Pena catheter.  He tends to tug on it and has a little bit of a skin tear at the urethral opening   Neurological:  Positive for weakness. Negative for seizures.   Psychiatric/Behavioral:  Positive for confusion. Negative for hallucinations.         Past Medical History  Past Medical History:   Diagnosis Date    Chronic hepatitis C (HCC) 11/05/2024    Cirrhosis (HCC) 11/05/2024    Esophageal varices in cirrhosis (HCC)     History of rectal bleeding     Liver cancer (HCC)     Portal hypertension (HCC) 11/05/2024       Meds: Reviewed    ContinuousInfusions:    HYDROmorphone 3 mg/hr (11/08/24 1001)    sodium chloride 10 mL/hr at 11/07/24 2022       Scheduled Meds:     fentaNYL  1 patch TransDERmal Q72H       PRN Meds:  sodium chloride flush, HYDROmorphone, bisacodyl, acetaminophen, risperiDONE, HYDROmorphone, HYDROmorphone, LORazepam, ondansetron, hyoscyamine      Physical Exam:  Patient Vitals for the past 24 hrs:   BP Temp Temp src Pulse Resp SpO2   11/08/24 0430 104/64 98.4 °F (36.9 °C) Temporal 99 20 
                                                    HCC - Progress Note       Admit Date: 11/4/2024     Hospital Day: 7      Interval History: Mr. Young is slowly worsening.  He really did not respond when I spoke with him or examined him today.  Nurses report that his agitation has decreased.  He did receive 1 dose of Ativan 1 mg last night.  Because of increased agitation hydromorphone was increased to 4.75 mg/h.     Interval ROS:     Review of Systems   Unable to perform ROS: Patient unresponsive        Past Medical History  Past Medical History:   Diagnosis Date    Chronic hepatitis C (HCC) 11/05/2024    Cirrhosis (HCC) 11/05/2024    Esophageal varices in cirrhosis (HCC)     History of rectal bleeding     Liver cancer (HCC)     Portal hypertension (HCC) 11/05/2024       Meds: Reviewed    ContinuousInfusions:    HYDROmorphone HCl PF (DILAUDID) 50 mg in sodium chloride 0.9 % 250 mL infusion 4.75 mg/hr (11/10/24 0500)       Scheduled Meds:     lactulose  20 g Oral TID    neomycin  500 mg Oral Q12H    risperiDONE  1 mg Oral Nightly    fentaNYL  1 patch TransDERmal Q72H       PRN Meds:  glycopyrrolate, scopolamine, sodium chloride flush, HYDROmorphone, bisacodyl, acetaminophen, risperiDONE, LORazepam, ondansetron, hyoscyamine     ACCUCHECK: No results found for: \"POCGLU\"    Physical Exam:  Patient Vitals for the past 24 hrs:   BP Temp Temp src Pulse Resp SpO2   11/10/24 1230 -- (!) 102.1 °F (38.9 °C) Temporal -- -- --   11/10/24 0415 132/61 100.2 °F (37.9 °C) -- (!) 116 10 (!) 81 %   11/09/24 1605 100/60 99 °F (37.2 °C) Temporal 96 10 --     Physical Exam  Constitutional:       Comments: Ill-appearing male, did not respond to touch or voice   HENT:      Head: Normocephalic.      Comments: Temporal wasting     Right Ear: External ear normal.      Left Ear: External ear normal.      Nose: Nose normal.   Eyes:      Comments: Pupils equal but small, conjunctiva revealed minimal scleral icterus, not really responding 
                                                    HCC - Progress Note       Admit Date: 11/4/2024     Hospital Day: 8      Interval History: Dilaudid drips up to 5.75 mg an hour.  He was more awake after being repositioned and asked for a drink of water.  He is quite confused however.  He was able to indicate he was having some back discomfort.  He has essentially been unable to take any oral medications.  Secretions have not been a major problem.  Family is at the bedside and questions answered.        Interval ROS:     Review of Systems   Unable to perform ROS: Patient nonverbal   Constitutional:  Negative for chills and fever.   HENT:  Positive for trouble swallowing.    Respiratory:  Negative for shortness of breath.    Cardiovascular:  Negative for leg swelling.   Gastrointestinal:  Positive for abdominal distention and abdominal pain. Negative for diarrhea and vomiting.   Genitourinary:         He has a Pena catheter.  He tends to tug on it and has a little bit of a skin tear at the urethral opening   Neurological:  Positive for weakness. Negative for seizures.   Psychiatric/Behavioral:  Positive for confusion. Negative for hallucinations.         Past Medical History  Past Medical History:   Diagnosis Date    Chronic hepatitis C (HCC) 11/05/2024    Cirrhosis (HCC) 11/05/2024    Esophageal varices in cirrhosis (HCC)     History of rectal bleeding     Liver cancer (HCC)     Portal hypertension (HCC) 11/05/2024       Meds: Reviewed    ContinuousInfusions:    HYDROmorphone HCl PF (DILAUDID) 50 mg in sodium chloride 0.9 % 250 mL infusion 5.75 mg/hr (11/11/24 0920)       Scheduled Meds:     lactulose  20 g Oral TID    neomycin  500 mg Oral Q12H    risperiDONE  1 mg Oral Nightly    fentaNYL  1 patch TransDERmal Q72H       PRN Meds:  glycopyrrolate, scopolamine, sodium chloride flush, HYDROmorphone, bisacodyl, acetaminophen, risperiDONE, LORazepam, ondansetron, hyoscyamine      Physical Exam:  Patient Vitals for 
                                                    HCC - Progress Note       Admit Date: 11/4/2024     Hospital Day: 9      Interval History: He is having periods of considerable restlessness requiring supplemental Ativan as well as Dilaudid.  He is more congested and having more secretions requiring treatment.  He has some increased shortness of breath.  He awakens at times.  Really cannot but remains impulsive and still able to sit up in bed at times.   Dilaudid drip is up to 7.5 mg an hour    Interval ROS:     Review of Systems   Unable to perform ROS: Patient unresponsive   Constitutional:  Negative for chills and fever.   Cardiovascular:  Negative for leg swelling.   Gastrointestinal:  Positive for abdominal distention. Negative for diarrhea and vomiting.   Genitourinary:             Neurological:  Negative for seizures.   Psychiatric/Behavioral:  Positive for confusion.         Past Medical History  Past Medical History:   Diagnosis Date    Chronic hepatitis C (HCC) 11/05/2024    Cirrhosis (HCC) 11/05/2024    Esophageal varices in cirrhosis (HCC)     History of rectal bleeding     Liver cancer (HCC)     Portal hypertension (HCC) 11/05/2024       Meds: Reviewed    ContinuousInfusions:    midazolam      HYDROmorphone HCl PF (DILAUDID) 50 mg in sodium chloride 0.9 % 250 mL infusion 7.5 mg/hr (11/12/24 0833)       Scheduled Meds:     scopolamine  3 patch TransDERmal Q72H    lactulose  20 g Oral TID    neomycin  500 mg Oral Q12H    risperiDONE  1 mg Oral Nightly    fentaNYL  1 patch TransDERmal Q72H       PRN Meds:  LORazepam, glycopyrrolate, sodium chloride flush, HYDROmorphone, bisacodyl, acetaminophen, risperiDONE, ondansetron, hyoscyamine      Physical Exam:  Patient Vitals for the past 24 hrs:   BP Temp Temp src Pulse Resp SpO2   11/12/24 0418 (!) 145/67 97 °F (36.1 °C) Temporal (!) 123 26 (!) 45 %   11/11/24 1617 -- -- -- -- (!) 32 --   11/11/24 1601 (!) 132/56 (!) 100.9 °F (38.3 °C) -- (!) 120 (!) 32 -- 
 rounded on patient. He is peaceful. No family at bedside. He remains GIP level of care.  
 rounded on patient. No family or friends present at this time. He remains GIP level of care.  
 rounded on patient. No family present.He stated he is hurting in his lower back this am. He remains GIP level of care.  
Arrived at 1510 per private vehicle. Walked self to room 6. Skin assessment and head to toe assessment performed. Alert and oriented to person and place with confusion to time. Followed all directions appropriately. Stated was hungry and was provided soda, water, chicken soup, and peanut butter crackers. Patient GIP for medication management and adjustment related to recent behaviors and walking away from home today. Dr Chang aware of arrival and verbal orders received and repeated for accuracy.   
Bedside rounds and SN found patient getting out of bed without using his call light. Patient states I had to go pee. SN assist patient up to bathroom, he is weak and using door facing and sink to support himself, SN assist patient to sit on toilet, voided small amount of dark dewey urine. SN assist patient back to bed. Patient admitted to having pain  in his right hip and buttocks but request to hold off on pain medication at this time.  
Change of shift report and rounds. Patient alert but confused. Hydromorphone 3.75 mg/hr. Denies pain at this time. Bed in lowest position and locked. Call bell within reach. Upper  rail up x 2. Family at bedside.   
Change of shift report and rounds. Patient is sitting up in bed talking to family. Alert but quiet confused. Pleasant. Bed in lowest position and locked. Call bell within reach. Upper rail up x 2.   
Change of shift report and rounds. Patient trying to get out of bed. Hydromorphone drip @ 1 mg/hr. Straightened bed out with help of PCA. Bed in lowest position and locked. Call bell within reach. Upper rails up x 2. PCA staying in room for safety  
Complained of back and leg pain 8/10 and restlessness unrelieved by PO pain medication. 20 G IV started in left AC without difficulty x2 sticks. Administered IV Dilaudid 1mg.   
Contacted  for possible orders on something for secretions.  
Contacted  for possible titration orders for pain and SOA   
Dilaudid 1 mg IVP and Lorazepam 1 mg IVP given for comfort.  
Dilaudid 1 mg IVP given for SOA and pain post bath and bed change.  
Dilaudid 1 mg and Lorazepam 1 mg IVP given for comfort. Dilaudid drip increased to 3.75 mg/hr for comfort.  
Dilaudid 4 mg PO and Risperdal 1 mg PO given for restlessness and pain  
Dilaudid 4 mg given po for complains of pain at an 8 all over. Will continue to monitor.    0215 PRN follow up  Effective, resting quietly with eyes closed.   
Dilaudid drip started at 1 mg/hr for comfort  
Dr Chang made visit while staff present in Pt's room.  Pt's pain, distended abdomen, symptoms and recent history discussed.  Dr Chang to make changes for better pain comfort.  Pt remains at GIP level of care.  
Dr Chang made visit with Pt and family.  Pt continues to sleep and opens his eyes while Dr Chang at bedside.  Pt had FLACC of 0 with no symptoms of distress with Dilaudid drip at 5.5 mg/hr.   Pt was later turned and repositioned became awake, alert, complained of pain, and drank a few sips of water.  Pt's Dilaudid drip was increased to 5.75 mg/hr at 0920.  Pt has no new needs and remains at [facility] level of care  
Dr Chang made visit with Pt.  GLADYS had to leave for a few minutes and will be returning soon.  Pt more comfortable since PRN Ativan given.  Pt's restlessness and SOA discussed.  Dr Chang discussed making medication changes.   Pt remains at GIP level of care.  
Dr Chang made visit with Pt.  Pt resting comfortably with FLACC of 0 with no symptoms of distress.  Pt mostly responsive with exception to will turn turn self in bed and respond verbally on occasionally.  Pt is on a Dilaudid drip at 1.5 mg/hr.  Pt has no new needs and remains at GIP level of care for pain and comfort.  
FOLLOW UP FOR INCREASE IN IV DILAUDID GTT TO 8.75 MG/HR AND IV VERSED GTT TO 2 MG/HR.  Patient remains with mild labored respirations and audible secretions are quieter.    
FOLLOW UP FOR PRN IV DILAUDID 1 MG AND PRN IV ATIVAN 1 MG GIVEN AND INCREASE IN IV DILAUDID GTT TO 4.75 MG/HR.  Patient's brother and girlfriend are at bedside.  Patient resting well with eyes closed, respirations nonlabored.  FLACC 0.   
Family here to visit.  
Follow up PRN lorazepam. Patient still agitated. Security called. Risperdal 1mg oral liquid given. Patient complaining of pain. Hydromorphone increased to 2.25mg/hr  
Follow up hydromorphone drip increase- patient still complaining of pain. Rates it as \" bad\" . Hydromorphone increased to 1.5 mg/hr  
Follow up increase hydromorphone. Patient resting quietly at this time.   
Follow up prn lorazepam and increase on hydromorphone. Patient resting quietly in bed with eyes closed  
Follow up risperdal and hydromorphone increase. Patient sitting in wheelchair at nursing desk. Calm but confused.   
Follow up to Dilaudid and Versed drip increased for pain, SOA with labored RR, and restlessness.  Pt is resting more comfortably with RR  less labored at 24.  Pt has audible secretions and rhonchi.  PRN Robinul 0.2 mg IVP given.  Safety measures in place.    
Follow up to Dilaudid and Versed drips increased for pain, SOA with labored RR, and restlessness/anxiety.  Pt's RR continues to be labored in low 30 s, and Pt continues with light restlessness.  Dilaudid drip increased to 8.25 mg/hr and Versed drip increased to 1.75 mg/hr.  Pt's brother and GLADYS made visit but brother could not stay long.  Brother and GLADYS very tearful.  Emotional support provided.  Safety measures in place.  
Follow up to Dilaudid drip increased to 1.75 mg/hr.  Pt is resting with eyes closed., quiet, has FLACC of 0, no grimacing or symptoms of distress.  Pt's family at bedside.  Pt has no needs at this time. Safety measures in place.  
Follow up to Dilaudid drip increased to 5.75 mg/hr.  Pt is resting comfortably at this time with FLACC of 0 and no symptoms of distress.  Family at bedside. Safety measures in place.  
Follow up to Dilaudid drip increased to 6 mg/hr.  Pt is sleeping comfortably with FLACC of 0 and has no symptoms of pain or distress.  Safety measures in place.  
Follow up to PRN Dilaudid and PRN Ativan given for pain and restlessness.  At this time Pt is on BSC with PCA/sitter at bedside.  Pt states his pain is not better and he continues with restlessness.    
Follow up to PRN Dilaudid and PRN Risperdal.  Pt continues with pain and restlessness.  Sitter at bedside for Pt Safety.  Pt denies needs.  Safety measures in place.  
Follow up to PRN Dilaudid for back pain.  Pt states his pain is a little better.  Pt has no other needs at this time.  Safety measures in place.  Will continue to monitor Pt.  
Follow up to PRN Dilaudid, PRN Ativan, PRN Robinul, and Dilaudid drip increases for SOA, anxiety, restlessness, and audible secretions.  Pt is resting/sleeping comfortably with FLACC of 0, unlabored RR 24, has no audible secretions, and has no symptoms of distress.   New bag of Dilaudid placed at current rate of 6.5 mg/hr.  
Follow up to PRN Robinul 0.2 mg IVP given for audible secretions and rhonchi.  At this time Pt is resting comfortably with FLACC of 0, no symptoms of distress, and audible secretions are less audible.  Pt's brief clean and dry, and Pena draining well and patent.  Safety measures in place.  
Follow up to PRN Zofran given for nausea.  Pt has had no emesis and states nausea is better at this time.  Pt has no other needs.  Sitter at bedside.  Safety measures in place.  
Follow up to Versed drip increase for restlessness/anxiety.  At this time Pt is resting more comfortably and has no restlessness at this time.  Pt has no needs.  Safety measures in place.  
Follow up to Versed drip start for anxiety/restlessness, and to increased in Dilaudid drip for pain and SOA.  Pt has no moaning or grimacing but continues with anxiety restlessness.  Versed drip increased to 1.25 mg/hr.   Oral and lip care provided.  Safety measures in place.   
Follow up. Pt is calm and resting, and now has family member at bedside. With safety measure in place.   
Follow up. Pt is now resting with safety measures in places.   
Found patient getting out of bed, states I know I am supposed to call for help but I really had to go. SN assist patient to bathroom, patient sat for several minutes on the toilet but could not void at this time, SN assist patient back to bed. Now complains of nausea and feeling SOA, zofran 4mg ODT given po and oxygen in place at 2L/NC and fan also put in room to help circulate the air.    2120 PRN follow up  Patient reports nausea is better but not totally relieved, SN assist patient back to BR per his request, and assist back to bed.     
Increased Dilaudid drip to 3.25 mg /hr for comfort.  
Increased Dilaudid drip to 3.5 mg/hr and Dilaudid 1 mg IVP and Lorazepam 1 mg IVP for comfort  
Increased Dilaudid drip to 4.25 mg/hr for comfort.  
Increased Dilaudid drip to 4.5 mg/hr for comfort  
Lorazepam 1 mg IVP given for restlessness/anxiety.  
Lorazepam 1 mg PO given for anxiety  
New bag of Dilaudid hung  
New bag of Dilaudid hung  
New bag of Dilaudid placed at current rate of 1.75 mg/hr.  Pt resting comfortably with FLACC of 0, no symptoms of distress, and will occasionally move, turn.  Pt has no needs at this time.  Safety measures in place.  
New bag of Dilaudid placed at current rate of 7.5 mg/hr, PRN Ativan 1 mg IVP given for restlessness/comfort/SOA, PRN Robinul 0.2 mg IVP given, and scopolamine patches x 3 placed to behind left ear, scopolamine patch x 1 removed from behind right ear and wasted in Rx Destroyer.  Oral and lip care provided.  Pt recently turned and repositioned in bed.  Safety measures in place.    
New bag of Dilaudid placed at increased rate of 8.5 mg/hr for SOA with laboring RR.  Versed drip continues at 1.75 mg/hr.  Pt has no other needs at this time.  Safety measures in place.  
New bag of hydromorphone hung.   
New hydromorphone bag hung  
PRN Dilaudid 4 mg given for pain 9/10 to back, PRN Risperdal 1 mg oral solution given for restlessness as Ativan has not been effective per night staff, and Fentanyl 100 mcg placed as scheduled.  75 mcg Fentanyl patch removed and wasted per RX Destroyer.  Pt's safety measures in place.  Will continue to monitor Pt.  
PRN IV ROBINUL 0.2 MG GIVEN per Kamala RN for audible secretions and large amount of oral secretions.  Patient tolerates well.  FLACC 0.  VS taken.  Kamala hung new bag of IV Dilaudid gtt.  Dose remains at 8.75 mg/hr.      0544  follow up for prn IV Robinul 0.2 mg given.  No results.  Patient remains with audible secretions  
PRN Zofran given for nausea and Pt states he is just sick and feels he has fever but afebrile at this time.  Sitter at bedside states Pt has been complaining of nausea.  Emesis bag within reach of Pt.  Pt asked for juice and given cranberry juice and Pt advised if nauseous would advise not to drink juice and he voiced understanding.  Dr Cedeno at bedside.  Safety measures in place.  
Patient awake and complains of back pain at a level 8, dilaudid 4mg given po as requested. Bed alarm attached to gown, bed in low position/locked and SR up x 3, call light within reach.Will have day shift do PRN follow up. Bed in low position/locked and SR up x3, call light within reach, bed alarm attached to gown.  
Patient awake, restless and complains of pain in lower back at a level 8 but unable to describe pain. Dilaudid 4mg given po and risperdal 1mg also given po. Scheduled lactulose given as patient was sleeping earlier when medication was due. Patient not as confused as he was last night. 1:1 sitter remains at bedside.    2350 PRN follow up  Effective and patient resting quietly with eyes closed. 1:1 sitter at bedside.  
Patient complained of cramping and pain in lower back and legs. Administered Ativan 1mg PO. Patient took med whole with water with no difficulty.  
Patient complains of pain at level 8 in low back and also admits to feeling anxious. Dilaudid 4mg given po and ativan 0.5 mg  given po. Will continue to monitor. Conts with 1:1 sitter.  Sitter reports patient slept approximately 50% of the night and did make several attempts to get out of bed but was easily redirected and laid down.      0630  PRN follow up  States pain is better but unable to rate pain. Conts with 1:1 sitter      
Patient has awakened and states his side is hurting.  Rates  as 8/10.  INCREASED IV DILAUDID GTT TO 4.75 MG/HR.  PRN IV ATIVAN 1 MG AND PRN IV DILAUDID 1 MG GIVEN.  Will continue to monitor.   
Patient is restless and cannot get comfortable in bed, SN gave patient a back rub to see if he could relax, not effective and ativan 1mg given  po for restlessness, warm blanket applied. Will continue to monitor.    2300 PRN follow up  Effective, patient resting quietly with eyes closed, bed in low position/locked and SR up x2, call light within reach.  
Patient is restless and complains of headache, risperdal 1mg given po and tylenol 650 mg also given po for headache. Will continue to monitor.    0415 PRN follow up  Effective, resting quietly with eyes closed, bed in low position/locked and SR up x3, call light within reach.  
Patient noted at 0730 to have no respirations, pulse, or other signs of life. Time of death called at 0730 on 11/13/2024. Brother, Jose notified of patient's death and is coming to the center. Dr. Chang aware of death as is [facility]. LUIS MANUEL Reno RN, [facility], present for the death.   
Patient restless and complains of headache, risperdal 1mg given po and tylenol 650 mg given po. Will continue to monitor.    0415 PRN follow   Effective, resting quietly with eyes closed  
Patient restless in bed. Claims he is having some pain but can not express location. Hydromorphone increased to 1.2 mg/hr  
Patient stated Ativan had not helped with cramping and pain in lower back and legs and his abdomen was hurting. Abdomen is greatly distended and firm.Patient rated pain 8/10 Administered Dilaudid 4mg PO.  
Patient trying to climb out of bed. Argumentative. Confused. PRN lorazepam given IVP  
Patient trying to get out of bed saying he is supposed to go home. PRN lorazepam 1mg IVP given. Patient claiming pain but unable to put the words together to verbalize where or how strong. Hydromorphone increased to 2 mg/hr  
Patient was repositioned in bed due to restlessness.  Two staff assisted to get patient re-centered to bed and at a high level for his head so he could breath better.  Patient was not able to hold a conversation on what was bothering him.    At 6:15am: Patient was repositioned again due to restlessness. Two staff assist to help patient get centered to the bed and elevate head of bed at 70 degrees for best breathing.  Oxygen level noted at 40-45% on 6-7L NC.    At 6:18am: RN called patients brotherJose to report some changes in patients condition.  RN left a message for him to call back.  
Patient with audible secretions.  Suctioned patient and large amount secretions obtained.  Patient's respirations labored at 24/minute, using accessory muscle (shoulders).  Oral care.  Already has scopolamine patches x 3.  Raised HOB.   Patient gagging at times as if trying to vomit.  PRN IV ROBINUL 0.2 MG GIVEN AND PRN IV ZOFRAN 4 MG AND PRN IV DILAUDID 1 MG.  Will continue to monitor     At 2035, patient remains SOA and with audible secretions.  Suctioned large amount.  NCREASED IV DILAUDID GTT TO 8.75 MG/HR AND IV VERSED GTT TO 2 MG/HR for shortness of air, labored respirations.  
Patient with respirations at 36/minute.  Labored.  INCREASED IV DILAUDID TO 9 MG/HR AND INCREASED IV VERSED TO 2.25 MG/HR.  Oral care given and patient suctioned.  Minimal results.  No response.  Using accessory muscles.  Will continue to monitor.     At 0640, FOLLOW UP FOR INCREASES in IV gtts.  Patient with no change.  Respirations remain labored.  Will continue to monitor.  Audible secretions.  
Patient's brother here to visit.   
Pena continues to leak.  Pena removed and new Pena placed with 150 ml clear orange colored urine with sediment output.  A couple of very small clots noted in urine.  Pt repositioned back to his back with pillows used to support body, extremities, and to off load heels.  Pt tolerated care well.  At rest Pt's RR labored at 34.  Pt's Dilaudid drip increased to 8 mg/hr and Versed drip increased to 1.5 mg/hr for comfort.  Safety measures in place.  Will continue to monitor Pt.    
Penis is very swollen but urethra is visible. Using sterile technique SN inserted silicone 16 Citizen of [place] parra without difficulty, with 175 ML dewey urine return. After parra inserted patient insist he has to get up and pee, having difficulty understanding how the parra drains his urine into the parra bag. Adeola PCA and SN assist patient to BSC as he insist. After several minutes assist patient back to bed and bed alarm attached to patients gown as he is frequently trying to get up and go to bathroom to void.  
Philip Young is a 55 yo what was admitted to the [facility] in [address].  He has been a patient of [facility].  Dr. Chang was rounding when I entered the room.  He is working to make some medication adjustments to maximize control of symptoms.  Philip is able to answer questions appropriately but his thought processes are noticeably slowed.  He admits that his confusion caused issues and it is not a good idea to return to his brother's home.  He is in agreement for referrals to be sent to facilities.  I called Jose (Brother) who agreed and requested that referrals be sent to [facility] and [facility].  I sent the HandP, Med List, and face sheet to all 3 facilities.  Seeking placement with hospice and medicaid for room and board.  Notified Koki Henning, STIVENW, home sw with [facility] as well as Nanci Larkin the TC for [facility].  
Philip Young remains GIP LOC in [address] at the [facility].  He is now on a dilaudid drip for comfort.  Dr. Chang was rounding when I was in the room.  Audible secretions can be heard at bedside.  He does appear comfortable and no family present.  I called his brother but did not get an answer.  I did update Rajinder at [facility] on his condition as Philip is not ready for discharge and it is seeming unlikely that he will discharge.  I spoke to LEXIE Calle with [facility] re: final arrangements.  She was not certain.  It is noted that they had said Keshia and plans in process on initial admit.  
Philip Young remains GIP LOC in [address].  He remains on a dilaudid drip for comfort.  He is awake today and continued with restlessness last night..  He answers my questions somewhat appropriately and is calm and settled.  Due to the mode of his medication, referrals to NH remains on hold.  Will continue to monitor for need for discharge and plan accordingly.    
Philip continues to be GIP LOC in [address] at the [facility].  Dr. Chang has rounded and states they continue to increase his drip for comfort.  He is awake but quite lethargic and unable to answer all questions appropriately.  His brother and Koki (brother's s/o) have been here but his brother, Jose, needed to leave due to high emotion and inability to cope at bedside.  Koki was going to take him home and return.  At this time, he remains unable to discharge given the mode of medication and the need to increase continues as they work to adequately provide comfort. He does appear to be declining and it does seem that discharge is unlikely.  Support provided and I will continue to visit daily this week to monitor for needs and provide support.  Jose has a good support system, which will be important in the day ahead.  
Philip is awake and restless today.  He reported pain and Brad RN gave him IV push medication for pain.    They have a sitter with him today for his restlessness as he has been trying to get out of bed.  I have tried to call his brother with no success.  His phone is not accepting calls and this is the only number we have for him.  I sent him a text requesting a call back.  Both [facility] and [facility] have declined to offer a bed because they do not accept [employer].  I called Rajinder with [facility] to get an update on the referral and he stated that they plan to come and see him today at [facility].  I explained to Rajinder that he is not yet ready for discharge as he is being given IV push meds and will have to be converted to oral before placement.  He voiced understanding.  I will expand my search if needed as discharge timing becomes more evident.  
Philip remains GIP LOC in [address] at the [facility].  No family is present but Koki (s/o of brother Jose) had been here earlier.    Philip is non-responsive and appears comfortable this visit.  He continues on a dilaudid drip and it has continued to be titrated up for comfort.  Unable to discharge on this mode of medication and symptom mgmt continues.  NHP will happen if he is able to discharge however, given his current condition, it does seem that discharge is unlikely.  He continues to decline and appears to be nearing the end of life.  
Pt assessment completed and scheduled medications given.  Dr Chang made visit while staff in the room.  Pt continues with pain and restlessness.  Pt ate approximately 25% of his meal but is drinking well.  Pt's bed in lowest position, locked with side rails raised x 3, titi light with in reach, clip bed alarm in place, room across from nurses desk, and frequent checks.  
Pt assessment completed.  Pt stretched and repositioned self to his back.  Pt had grimacing and light moan.  Pt's Dilaudid drip increased to 1.75 mg/hr for pain and comfort.  Pt has no other needs at this time.  Safety measures in place.    
Pt assessment completed.  Pt unresponsive during assessment, sleeping with FLACC of 0 and has no symptoms of distress.   Dilaudid continues at 5.5 mg/hr.  Pt has light rhonchi, hear regular, bowel sounds present, pedal pulses palpable, 2+ edema to BLE/feet.  Pt has no needs.  Safety measures in place.    
Pt assisted to BSC stating he has to have BM.  After a few minutes Pt stated he could not go and wanted to go back to bed.  Pt assisted back to bed with bed linens changed due to small open penile wound.  Pt repositioned with pillows used to support body, extremities, and to off load heels.  Safety measures in place including sitter at bedside and Pt's room across from nurses desk.   Will continue to monitor Pt.  
Pt assisted to BSC.  Pt given PRN Dilaudid 4 mg, PRN Ativan 1 mg for pain and restlessness.  Scheduled spironolactone also given.  Pt had no BM and was assisted back to bed after linens changed.  Pt's light turned out and he was repositioned for comfort in bed.  Bed in lowest position, locked with side rails raised x 3 call light within reach, clip bed alarm in place, sitter at bedside, room across from nurses desk, and frequent checks by staff.    
Pt became restless and stated mid back pain. @2209 PRN Dilaudid 1mg and ativan 1mg were given. Pt now repositioned and prompted up with pillows.  
Pt became restless and stated mid back pain. @2209 PRN Dilaudid 1mg and ativan 1mg were given. Pt now repositioned and prompted up with pillows.  Also continuous drip rate increased to 6.75mg/hr.  
Pt continues to rest comfortably with eyes closed, FLACC of 0 with no symptoms of distress.  Pt had turned self to his right side, and parra tubing readjusted for comfort.  Pt awakened momentarily during care but immediately went back to sleep.  Pt has no needs.  Safety measures in place.  Dilaudid continues at 1.75 mg/hr.  
Pt continues with SOA, labored RR, pain, and restlessness.  Dilaudid drip increased again to 7.5 mg/hr.    
Pt follow up to Dilaudid drip increased to 8.5 for SOA/laboring.  Pt resting comfortable with FLACC of 0, unlabored RR, and has no symptoms of distress.  Safety measures in place.  
Pt follow up to [facility].  Pt states his pain is better.  Pt sitting up in bed eating his dinner and states he would like a shower later.  Pt informed plan is for staff to assist him with a shower soon and he voiced understanding.  Safety measures in place.  
Pt found to have alarm gone off sitting on BSC with parra stretched out to max length.  Staff had been in another room providing incontinence care to other Pt.  Pt reminded he has clilp bed alarm in place and was instructed to not get OOB without assist and he stated he forgot.  Clinical House Colin called and requested sitting for Pt safety and he will be sending sitter for Pt.  Pt was unable to have BM and was assisted back to bed, repositioned for comfort with pillows to support body, extremities, and to off load heels.  Clip bed alarm placed, bed placed back to lowest position, side rails raised x 3, call light within reach, room across from nurses desk for better observation.    
Pt had just been given bed bath and became restless, anxious, and SOA with RR 32.  Pt's Dilaudid drip increased to 6.25 mg/hr, given PRN Dilaudid 1 mg IVP, PRN Ativan, PRN Robinul 0.2 mg IVP for audible wheezing and rhonchi.  Head of bed raised to high Fowlers.  Pt continued with restlessness, and SOA.  At 1635 Pt's Dilaudid drip increased to 6.5 mg/hr.  Stayed with Pt until he relaxed.  Will continue to monitor Pt.  
Pt has been sleeping since last visit to his room.  Sitter called out stating Pt requires assist to BSC.  Pt assisted to PCA but was unable to have BM.  Pt has parra in place, and Pt continues to have confusion.  Pt stated he was finished and was assist back to back after linens changed.  Pt repositioned with pillows used to support body, extremities and to off load heels.  Warm blanket provided.  Sitter at bedside.  Safety measures in place.  
Pt provided with incontinence care with soap and water of moderate leaking of parra.  Pt turned and repositioned to his left side with pillows used to support body, extremities, and to off load heels.  Oral and lip care provided.  Safety measures in place.  
Pt report and rounding with off going shift staff.  Pt is awake, pleasant and has no needs.  Pt had recent pain medication.  Pt states he is good at this time.  Pt's bed in lowest position, locked with side rails raised x 3, clip bed alarm in place, room across from nurses desk, call light within reach.  
Pt report and rounding with off going shift staff.  Pt is sleeping and has some light restlessness, and labored RR.  Pt's Dilaudid drip increased to 7.25 mg//hr for comfort.  GLADYS is at bedside. Pt's bed in lowest position, locked with side rails raised x 3, call light within reach and room across from nurses desk.   
Pt report and rounding with off going shift staff.  Pt is sleeping comfortably, unresponsive with FLACC of 0 and has no symptoms of pain or distress.  Pt is on a Dilaudid drip at 7 mg/hr.  Pt has no needs at this time.  Pt's bed in lowest position, locked with siderails raised x 3, call light within reach, and room across from nurses desk.        
Pt report and rounding with off going shift staff.  Pt is sleeping comfortably,mostly unresponsive with FLACC of 0 and has no symptoms of pain or distress.  Pt is on a Dilaudid drip at 1. 5 mg/hr.  Pt has no needs at this time.  Pt's bed in lowest position, locked with siderails raised x 3, call light within reach, bed alarm in place.  
Pt resting in bed at this time. Awake and oriented to self. Pt has been confused off and on today. Brother and GLADYS visited this morning. They are requesting to be called for any changes in patient condition. Also asked to be called if pt becomes restless as they will come sit with him as needed. Dilaudid infusion has been increased x 3 times so far today. Will continue to monitor for s/s of distress.  
Pt resting in bed with eyes closed. Call light within reach. Bed in low position. Side rails up x 2.FLACC score=0. No acute respiratory distress.  
Pt sleeping comfortably with FLACC of 0 and has no symptoms of distress.  Pt is lying on his right side in fetal position.  Pt has a sitter at bedside due to fall risk and impulsiveness.  Safety measures in place, including bed alarm, room across from nurses desk, side rails raised x 3, bed in lowest position.    
Pt sleeping comfortably with FLACC of 0 and has no symptoms of pain or distress.  Pt repositioned for comfort.  Pt has no other needs.  RR even and unlabored.  Safety measures in place.  
Pt turned and repositioned to his left side with pillows used to support body, extremities and to off load heels.  Pt tolerated care well with no symptoms of pain or distress.  Safety measures in place.  
Pt turned and repositioned to his right side with pillows used to support body, extremities and to off load heels.  Pt tolerated care well with no symptoms of pain or distress.  Safety measures in place.  Dilaudid drip continues at 1.75 mg/hr.    
Pt's Fentanyl 100 mg patch replaced to MARYLIN and removed from Left posterior shoulder and wasted in RX Destroyer.  Pt had pain with movement to remove patch from posterior shoulder.  Dilaudid drip increased to 6 mg/hr.  Family at bedside.  Safety measures in place.   
Pt's lunch tray removed as Pt states he is finished with his tray.  Pt only ate a couple of bites of meatloaf and at his pineapple.  Pt states he isn't hungry.  Pt denies needs.  Sitter at bedside.  Safety measures in place.  
RESTLESSNESS and PAIN: Patient complains of lower back pain, with grimacing and moaning.  He is restless attempting to stand up at bedside.  He had detached his parra from the drainage bag and oxygen tubing was stretched from the wall.  He received Ativan 1mg IVP for restlessness and was increased to Dilaudid 5.25mg/hr for back pain.    At FOLLOWUP at 03:05am: Patient was still sitting up at bedside, with RN sitting beside him ensuring safety while he sat up.  Patient is having a hard time remaining upright without leaning and weakening to fall back in bed. He is not able to hold a conversation due to focus on breathing.  He was increased again to Dilaudid 5.5mg/hr for pain to lower back since he is able to nod his head yes pattern when asked if he was still in pain.    At FOLLOWUP at 03:20am: Patient was getting more lethargic, he was stood up with two staff and assisted to lay back down in bed.  He was able to close his eyes and rest for a few minutes after vital signs were obtained. He was not able to answer regarding if he was still hurting due to lethargy. FLACC 0/10.    At FOLLOWUP at 04:10am: Patient was awake again, scooting to left side of bed, sitting up in a weakened condition.  PCA  Adeola staying with patient to attempt to get him settled in his discomfort and restlessness.  
RN to RN bedside rounds and report completed, awake and no complaints voiced. Bed in low position/locked and call light within reach. Patient made aware to use call light if he needed anything, patient with positive verbal understanding.  
RN to RN bedside rounds and report completed, patient resting quietly with eyes closed and 1:1 sitter at bedside.  
Rates pain an 8 in his right hip and buttocks, dilaudid 4mg given po as requested. Will continue to monitor.    2205 PRN follow up  States pain went from a 8 down to a 4.  
Robinol 0.2 mg IVP given   
Robinol 0.2 mg IVP given for secretions.  
Rounded with nightshift nurses Lauren and Kamala. Patient is awake and sitting up in bed denied any discomfort. Sitter at bedside for safety.  
Rounded with nightshift nurses Lauren and Kamala. Patient took his last breath during rounding. Patient's brother and  notified.   
Rounded with nightshift nurses Lisa and Torie. Patient is asleep and peaceful.  
Rounding with change of shift report from Gregoria RN.  Patient resting in bed with eyes closed, respirations mildly labored.  FLACC 0.  Has IV Dilaudid gtt infusing at 8.5 mg/hr and IV Versed gtt at 1.75 mg/hr to left upper arm IID, intact and patent.  Has audible secretions.  Safety precautions in place of bed in low position, locked.  Upper side rails x 2 raised.  Call light within reach.   
Rounding with change of shift report from Oneyda ADAMS.  Patient is resting with eyes closed, respirations unlabored.  FLACC 0. Safety precautions in place of bed in low position, locked.  Upper side rails x 2 raised.  Call light within reach.  IV Dilaudid gtt infusing at 4.5 mg/hr to right forearm, intact and patent. Will continue to monitor.   
SN found patient getting up out of bed, states I have to pee. Gait remains weak and unsteady, assist patient to toilet, voided small amount of dewey urine, assist back to bed. Complains of pain 8 all over. Will medicate per patients request.  
SN present at the patient's bedside along with Dr. Garcia for unit rounds. No new orders given. Patient to remain GIP level of care. Will continue to monitor.  
SN present at the patient's bedside along with the night nurse for unit rounds. Patient is sleeping comfortably with no signs or symptoms of pain or restlessness noted. The bed is in the lowest position with the wheels locked in place. No family is present at the bedside. Will continue to monitor.  
SN present at the patient's bedside to administer Tylenol 650mg supp due to the patient having a fever. Tylenol 650mg supp administered without difficulty. Will follow up with the patient.   
SN present at the patient's bedside to administer a new bag of Dilaudid. Administered Dilaudid at 4.75mg/hr without difficulty. Patient has no signs or symptoms of pain at this time. Will continue to monitor.  
SN present at the patient's bedside to administer scheduled Lactulose. Lactulose administered without difficulty. Patient has no signs or symptoms of pain at this time. Will continue to monitor.  
SN present at the patient's bedside to administer scheduled Lactulose. Lactulose administered without difficulty. Patient has no signs or symptoms of pain noted. Will continue to monitor.  
SN present at the patient's bedside to assess the patient's pain level and restlessness. Patient complains of back pain. Increased the Dilaudid drip to 5mg/hr. Will follow up with the patient.  
SN present at the patient's bedside to assess the patient's pain level and restlessness. Patient is alert and is visiting with family. Patient has no signs or symptoms of pain or restlessness at this time. Will continue to monitor.  
SN present at the patient's bedside to assess the patient's pain level and restlessness. Patient is alert with no signs or symptoms of pain or restlessness noted. No family is present at the bedside. Will continue to monitor.  
SN present at the patient's bedside to assess the patient's pain level and restlessness. Patient is sleeping comfortably with no signs or symptoms of pain or restlessness noted. No family is present at the bedside. Will continue to monitor.  
SN present at the patient's bedside to assess the patient's pain level and restlessness. Patient is sleeping comfortably with no signs or symptoms of pain or restlessness noted. Patient's temperature is 102.1. Will administer Tylenol 650mg supp per order.  
SN present at the patient's bedside to assess the patient's pain level post increasing the Dilaudid drip to 5mg/hr. Patient states the pain is better. No family is present at the bedside. Will continue to monitor.  
Scheduled medications held at this time. Unable to wake patient enough to safely give medications.   
[place] patient getting up, SN walked in room and patient standing at bedside holding onto bedside table, states I have to pee. Bedside commode used as it is getting harder for patient to get to bathroom. Patient sat on BSC for several minutes but unable to void. Assist back to bed, abdomen is distended unable to tell if it is disease process or his bladder. Patient does agree to parra catheter.  
The pt is awake but confused.  No needs identified.  Prayer support provided.  No family present.   
The pt is lying in bed.  He appears to be actively dying.  His family all seem to be accepting of the pts prognosis.  Prayer support provided. No needs identified.  Will follow.   
The pt is non responsive.  He appears to be actively dying.  No needs identified  No family present..  Will follow. .   
The pt is sitting up in bed.  He is alert but confused at times. No needs identified. The pt expresses john and seems at peace. Prayer support provided.  Will follow.   
The pt is sitting up in bed.  He is alert but confused.   Prayer support provided. No needs expressed or identified.  No family present.   Will follow.   
Versed drip started at 1 mg/hr as scheduled.  Pt SOA, with labored RR, grimacing, and has light moaning.  Pt's Dilaudid drip increased to 7.75 mg/hr for SOA, pain, and comfort.  Safety measures in place.    
While both staff members in another room Pt got up from bed and got in the shower.  Pt flooded the floor with water in the bathroom and in his room.  House keeping called and they came and cleaned up the water with a shop vac.  Pt's bed linens changed and he was assisted back to bed.  Pt states his pain is still present but tolerable at this time.  Pt's bed placed in lowest position, locked with side rails raised x 3, call light within reach, and frequent checks by staff.  
While obtaining vital signs pt became restless stating he was having trouble breathing once we sat the pt upright he began to slide towards the front end of the bed, feet were almost hanging off the bed. @0401 another round of PRN dilaudid and ativan were given. At the moment pt is sitting up right position with safety measures in place. Also continuous drip is now at 7 mg/hr.  
U/L    AST 47 (H) 5 - 40 U/L        Principal Problem:    Restlessness and agitation  Active Problems:    Malignant neoplasm of liver, not specified as primary or secondary (HCC)    Cirrhosis (HCC)    Chronic hepatitis C (HCC)    Portal hypertension (HCC)    Esophageal varices in cirrhosis (HCC)    Thrombocytopenia (HCC)    Macrocytic anemia    Ascites    Protein calorie malnutrition (HCC)    Hepatic encephalopathy (HCC)  Resolved Problems:    * No resolved hospital problems. *      ASSESSMENT: His confusion seems to be more related to his hepatic encephalopathy probably combined with his advanced hepatocellular carcinoma.  His ammonia level is moderately elevated.  He is not having any agitation but remains confused and somewhat restless.  He has not had a bowel movement and we will increase his lactulose and add low-dose neomycin for his encephalopathy.  We will continue oral Dilaudid with IV if needed and see how he does on the higher dose of fentanyl before adjusting further.  Continue Risperdal for agitation if it develops and Ativan for anxiety.  We will continue treatment for his ascites.    Patient Active Problem List   Diagnosis Code    Carcinoma of liver (HCC) C22.0    Malignant neoplasm of liver, not specified as primary or secondary (HCC) C22.9    Cirrhosis (HCC) K74.60    Chronic hepatitis C (HCC) B18.2    Portal hypertension (HCC) K76.6    Esophageal varices in cirrhosis (HCC) K74.60, I85.10    Thrombocytopenia (HCC) D69.6    Macrocytic anemia D53.9    Ascites R18.8    Protein calorie malnutrition (HCC) E46    Hepatic encephalopathy (HCC) K76.82    Restlessness and agitation R45.1         PLAN:   See orders  Add neomycin 500 mg twice daily and increase lactulose to 3 times daily  Dilaudid IV or oral as needed for pain in addition to the fentanyl patch.  Fentanyl may need to be increased.  Ativan for anxiety  Risperdal for agitation  Add Seroquel at bedtime  Continue spironolactone and Lasix.  Potassium 
orders  Continue Dilaudid drip with boluses as needed and titration as needed  Restart lactulose and neomycin  Continued Ativan for anxiety  Risperdal for agitation  Family support, no one was in the room when I was there this morning    He remains GIP       Electronically signed by Lisa Garcia MD on 11/9/24 at 9:18 AM CST    (Please note that portions of this note were completed with a voice recognition program.  Efforts were made to edit the dictations but occasionally words are mis-transcribed.)